# Patient Record
Sex: MALE | Race: WHITE
[De-identification: names, ages, dates, MRNs, and addresses within clinical notes are randomized per-mention and may not be internally consistent; named-entity substitution may affect disease eponyms.]

---

## 2019-02-11 ENCOUNTER — HOSPITAL ENCOUNTER (EMERGENCY)
Dept: HOSPITAL 74 - JER | Age: 79
Discharge: NURSING, REHAB OR LONG-TERM CARE | End: 2019-02-11
Payer: COMMERCIAL

## 2019-02-11 VITALS — DIASTOLIC BLOOD PRESSURE: 74 MMHG | TEMPERATURE: 98 F | HEART RATE: 88 BPM | SYSTOLIC BLOOD PRESSURE: 144 MMHG

## 2019-02-11 VITALS — BODY MASS INDEX: 29.2 KG/M2

## 2019-02-11 DIAGNOSIS — G20: ICD-10-CM

## 2019-02-11 DIAGNOSIS — L02.31: Primary | ICD-10-CM

## 2019-02-11 DIAGNOSIS — F17.210: ICD-10-CM

## 2019-02-11 DIAGNOSIS — Z85.79: ICD-10-CM

## 2019-02-11 DIAGNOSIS — J44.9: ICD-10-CM

## 2019-02-11 DIAGNOSIS — I12.9: ICD-10-CM

## 2019-02-11 DIAGNOSIS — N18.9: ICD-10-CM

## 2019-02-11 LAB
ALBUMIN SERPL-MCNC: 2.9 G/DL (ref 3.4–5)
ALP SERPL-CCNC: 129 U/L (ref 45–117)
ALT SERPL-CCNC: 8 U/L (ref 13–61)
ANION GAP SERPL CALC-SCNC: 5 MMOL/L (ref 8–16)
APPEARANCE UR: CLEAR
APTT BLD: 28.2 SECONDS (ref 25.2–36.5)
AST SERPL-CCNC: 11 U/L (ref 15–37)
BASOPHILS # BLD: 1.4 % (ref 0–2)
BILIRUB SERPL-MCNC: 0.3 MG/DL (ref 0.2–1)
BILIRUB UR STRIP.AUTO-MCNC: NEGATIVE MG/DL
BUN SERPL-MCNC: 25 MG/DL (ref 7–18)
CALCIUM SERPL-MCNC: 8.5 MG/DL (ref 8.5–10.1)
CHLORIDE SERPL-SCNC: 102 MMOL/L (ref 98–107)
CO2 SERPL-SCNC: 28 MMOL/L (ref 21–32)
COLOR UR: (no result)
CREAT SERPL-MCNC: 1.8 MG/DL (ref 0.55–1.3)
DEPRECATED RDW RBC AUTO: 18.7 % (ref 11.9–15.9)
EOSINOPHIL # BLD: 1.4 % (ref 0–4.5)
EPITH CASTS URNS QL MICRO: (no result) /HPF
GLUCOSE SERPL-MCNC: 103 MG/DL (ref 74–106)
GRAN CASTS URNS QL MICRO: 7 /LPF
HCT VFR BLD CALC: 31 % (ref 35.4–49)
HGB BLD-MCNC: 10.6 GM/DL (ref 11.7–16.9)
INR BLD: 1.03 (ref 0.83–1.09)
KETONES UR QL STRIP: NEGATIVE
LEUKOCYTE ESTERASE UR QL STRIP.AUTO: (no result)
LYMPHOCYTES # BLD: 11.7 % (ref 8–40)
MCH RBC QN AUTO: 33.1 PG (ref 25.7–33.7)
MCHC RBC AUTO-ENTMCNC: 34.3 G/DL (ref 32–35.9)
MCV RBC: 96.5 FL (ref 80–96)
MONOCYTES # BLD AUTO: 16.9 % (ref 3.8–10.2)
MUCOUS THREADS URNS QL MICRO: (no result)
NEUTROPHILS # BLD: 68.6 % (ref 42.8–82.8)
NITRITE UR QL STRIP: NEGATIVE
PH UR: 5 [PH] (ref 5–8)
PLATELET # BLD AUTO: 170 K/MM3 (ref 134–434)
PMV BLD: 8.4 FL (ref 7.5–11.1)
POTASSIUM SERPLBLD-SCNC: 4.9 MMOL/L (ref 3.5–5.1)
PROT SERPL-MCNC: 6.5 G/DL (ref 6.4–8.2)
PROT UR QL STRIP: NEGATIVE
PROT UR QL STRIP: NEGATIVE
PT PNL PPP: 12.1 SEC (ref 9.7–13)
RBC # BLD AUTO: 3.21 M/MM3 (ref 4–5.6)
SODIUM SERPL-SCNC: 135 MMOL/L (ref 136–145)
SP GR UR: 1.01 (ref 1.01–1.03)
UROBILINOGEN UR STRIP-MCNC: NEGATIVE MG/DL (ref 0.2–1)
WBC # BLD AUTO: 3.9 K/MM3 (ref 4–10)

## 2019-02-11 PROCEDURE — 0J990ZZ DRAINAGE OF BUTTOCK SUBCUTANEOUS TISSUE AND FASCIA, OPEN APPROACH: ICD-10-PCS

## 2019-02-11 PROCEDURE — 3E013BZ INTRODUCTION OF ANESTHETIC AGENT INTO SUBCUTANEOUS TISSUE, PERCUTANEOUS APPROACH: ICD-10-PCS

## 2019-02-11 PROCEDURE — 3E03329 INTRODUCTION OF OTHER ANTI-INFECTIVE INTO PERIPHERAL VEIN, PERCUTANEOUS APPROACH: ICD-10-PCS

## 2019-02-11 NOTE — PDOC
Attending Attestation





- Resident


Resident Name: Bobby Silvestre





- ED Attending Attestation


I have performed the following: I have examined & evaluated the patient, The 

case was reviewed & discussed with the resident, I agree w/resident's findings 

& plan





- HPI


HPI: 





02/11/19 12:54


78-year-old male from [facility] referred by Dr. Gibson for 

incision and drainage of left gluteal abscess/cellulitis. Patient has had a 

small skin lesion there for some time, more recently with increased discomfort. 

No systemic symptoms of fevers or chills, patient has not noted any discharge.





- Physicial Exam


PE: 





02/11/19 12:54


Afebrile, triage heart rate noted currently 70 on examination


Exam is within normal limits, patient is well-appearing


Left gluteal superficial abscess in the mid buttock approximately 8 cm of 

induration with central 3-4 cm of fluctuance with active foul-smelling purulent 

drainage, no bleeding. Surrounding rim of cellulitis, the lesion is mobile 

without underlying palpable mass or deformity.





- Medical Decision Making





02/11/19 12:56


78-year-old male from [facility] with left buttock abscess and 

cellulitis, no evidence of SIRS or sepsis.


Check labs


Dose of IV vancomycin


Incision and drainage of abscess


Discussed with Dr. Rg, who agrees with plan for I&D and transfer back 

to nursing home on antibiotics.





**Heart Score/ECG Review


  ** #1


ECG reviewed & interpreted by me at: 12:11


General ECG Interpretation: Sinus Rhythm, Normal Rate (65), Normal Intervals (

qtc 434), No acute ischemic changes

## 2019-02-11 NOTE — EKG
Test Reason : 

Blood Pressure : ***/*** mmHG

Vent. Rate : 065 BPM     Atrial Rate : 065 BPM

   P-R Int : 144 ms          QRS Dur : 088 ms

    QT Int : 418 ms       P-R-T Axes : 055 -06 064 degrees

   QTc Int : 434 ms

 

SINUS RHYTHM WITH MARKED SINUS ARRHYTHMIA WITH PREMATURE VENTRICULAR

COMPLEXES

LOW VOLTAGE QRS

CANNOT RULE OUT ANTERIOR INFARCT , AGE UNDETERMINED

ABNORMAL ECG

WHEN COMPARED WITH ECG OF 06-DEC-2016 16:02,

PREMATURE VENTRICULAR COMPLEXES ARE NOW PRESENT

PREMATURE ATRIAL COMPLEXES ARE NO LONGER PRESENT

Confirmed by SRINIVAS DOUGLAS MD ([id number]) on 2/11/2019 2:36:15 PM

 

Referred By:             Confirmed By:SRINIVAS DOUGLAS MD

## 2019-02-11 NOTE — PDOC
History of Present Illness





- General


Chief Complaint: Wound


Stated Complaint: Abscess Boil


Time Seen by Provider: 02/11/19 10:59


History Source: Patient


Exam Limitations: No Limitations





- History of Present Illness


Initial Comments: 








79 yo m w a hx of Multiple Myeloma, parkinsonian features, urethral strictures, 

chronic renal failure and HTN presents to the ER [facility] from [facility] because of 

left gluteal buttock pain. He states that he has had an area of redness and 

swelling for the past 6 months but he came to the ER today because his PCP - 

Dr. Rg sent him here to have his abscess incised and drained. He 

states that he used to have more pain but now his pain has improved. 





He denies any fevers, chills, chest pain, SOB, diarrhea, constipation, headache

, nausea, vomiting, blurry vision, abdominal pain, arm or leg pain, numbness, 

weakness, tingling or recent travel. 





PCP: Dr. Rg


Oncologist: Dr. Crowell


PSH: None reported


Social Hx: Smokes a few cigarettes per day, use to smoke more


Allergies: NKA, NKDA











Past History





- Past Medical History


Allergies/Adverse Reactions: 


 Allergies











Allergy/AdvReac Type Severity Reaction Status Date / Time


 


No Known Allergies Allergy   Verified 12/06/16 12:30











Home Medications: 


Ambulatory Orders





Aspirin [Aspirin EC] 81 mg PO DAILY 11/30/16 


Omeprazole 10 mg PO DAILY 11/30/16 


Pramipexole Dihydrochloride [Mirapex -] 0.25 mg PO TID 12/06/16 


Docusate Sodium [Colace -] 100 mg PO BID PRN #28 capsule 12/12/16 


Acetaminophen 650 mg PO QID PRN 02/11/19 


Amlodipine Besylate [Norvasc -] 10 mg PO DAILY 02/11/19 


Carbidopa/Levodopa *Cr* 50/200 [Sinemet *Cr* 50/200 -] 1 combo PO TID 02/11/19 


Clindamycin [Cleocin -] 450 mg PO Q6HPO #28 capsule 02/11/19 


Cyanocobalamin Vit B-12 Inj. [Redisol] 1,000 mcg IM MONTHLY 02/11/19 


Furosemide [Lasix -] 20 mg PO DAILY 02/11/19 


Lenalidomide [Revlimid] 10 mg PO ASDIR 02/11/19 


Multivitamin,Ther and Minerals [Vitamin and Minerals] 1 each PO DAILY 02/11/19 


Rivastigmine Tartrate [Rivastigmine] 3 mg PO TID 02/11/19 


Spironolactone [Aldactone] 25 mg PO DAILY 02/11/19 


Valacyclovir HCl [Valtrex] 500 mg PO Q48H 02/11/19 








Anemia: No


Asthma: No


Cancer: Yes (Multiple myeloma)


Cardiac Disorders: No


CVA: No


COPD: Yes (?-ON INHALER-SMOKES 1 PPD)


CHF: No


Dementia: No


Diabetes: No


GI Disorders: No


 Disorders: No


HTN: Yes (DX 4/2014)


Hypercholesterolemia: No


Liver Disease: No


Seizures: No


Thyroid Disease: No





- Surgical History


Abdominal Surgery: No


Appendectomy: No


Cardiac Surgery: No


Cholecystectomy: No


Lung Surgery: No


Neurologic Surgery: No


Orthopedic Surgery: No





- Suicide/Smoking/Psychosocial Hx


Smoking History: Never smoked


Have you smoked in the past 12 months: No


Number of Cigarettes Smoked Daily: 20


Information on smoking cessation initiated: No


'Breaking Loose' booklet given: 08/26/15


Hx Alcohol Use: No


Drug/Substance Use Hx: No


Substance Use Type: None


Hx Substance Use Treatment: No





**Review of Systems





- Review of Systems


Able to Perform ROS?: Yes


Comments:: 








CONSTITUTIONAL:


Absent: fever, no chills, no fatigue


EYES:


Absent: visual changes


ENT:


Absent: ear pain, no sore throat


CARDIOVASCULAR:


Absent: chest pain, no palpitations


RESPIRATORY:


Absent: cough, no SOB


GI:


Absent: abdominal pain, no nausea, no vomiting, no constipation, no diarrhea


GENITOURINARY:


Absent: dysuria, no frequency, no hematuria


MUSKULOSKELETAL:


Absent: back pain, no arthralgia, no myalgia


SKIN:


Present: rash


NEURO:


Absent: headache











*Physical Exam





- Vital Signs


 Last Vital Signs











Temp Pulse Resp BP Pulse Ox


 


 97 F L  120 H  18   123/90   100 


 


 02/11/19 10:20  02/11/19 10:20  02/11/19 10:20  02/11/19 10:20  02/11/19 10:20














- Physical Exam


Comments: 








GENERAL:


Well-appearing, well-nourished. No apparent distress.


HEENT:


Normocephalic, atraumatic. PERRL, EOM intact.


CARDIOVASCULAR:


Normal S1, S2. Tachycardic rate and regular rhythm.


PULMONARY:


No evidence of respiratory distress. Lungs clear to auscultation bilaterally. 

No wheezing, rales or rhonchi.


ABDOMEN:


Soft, non-distended, non-tender.


EXTREMITIES:


Normal ROM in all four extremities. No gross deformities.


BUTTOCKS: 


There is a 4x3 cm erythematous area on the left gluteal region that is TTP that 

is associated with induration, swelling, and fluctuance. 


SKIN:


Warm, dry.  No rash other than buttocks


NEUROLOGICAL:


No focal neurological deficits.











Moderate Sedation





- Procedure Monitoring


Vital Signs: 


Procedure Monitoring Vital Signs











Temperature  97 F L  02/11/19 10:20


 


Pulse Rate  120 H  02/11/19 10:20


 


Respiratory Rate  18   02/11/19 10:20


 


Blood Pressure  123/90   02/11/19 10:20


 


O2 Sat by Pulse Oximetry (%)  100   02/11/19 10:20











ED Treatment Course





- LABORATORY


CBC & Chemistry Diagram: 


 02/11/19 11:43





 02/11/19 11:32





Medical Decision Making





- Medical Decision Making





79 yo m w a hx of Multiple Myeloma, parkinsonian features, urethral strictures, 

chronic renal failure and HTN [facility] because of 

left gluteal buttock pain. He states that he has had an area of redness and 

swelling for the past 6 months but he came to the ER today because his PCP - 

Dr. Rg sent him here to have his abscess incised and drained. He 

states that he used to have more pain but now his pain has improved. 





VS: Tachycardic to 120, otherwise WNL





DDx IBNLT: cellulitis, abscess, fistula, sepsis.





Plan: ED adult sepsis workup -labs, urine, wound culture, I&D, Pelvis CT w 

contrast, re-assess





- I spoke with Dr. Rg who simply wanted his abscess drained and then 

sent back to [facility]. 





Giving patient vancomycin in the ER for his cellulitis. 


- Will continue with clindamycin once he is out of the ER. 





I drained the abscess on his gluteal region of his butt and noticed that fecal 

matter was coming out. 


- Will obtain a pelvis CT with contrast to assess for fistula





CTAP results: In comparison to a prior CT study of 12/9/2016 interval 

development of an approximately 3 x 2 cm thick-walled subcutaneous structure is 

seen suggestive of an abscess along the left buttock inferiorly. Within this 

structure is a small focus of air/gas. Adjacent subcutaneous edema is 

visualized. Within the adjacent subcutaneous edema more medially two additional 

punctate foci of air /gas are visualized. No definite fistulous tract is seen. 





Will DC patient with Abx, send back to [facility] and recommend a wound check in 

the next 48 hours. 














*DC/Admit/Observation/Transfer


Diagnosis at time of Disposition: 


 Abscess, Cellulitis








- Discharge Dispostion


Disposition: HOME


Condition at time of disposition: Improved


Decision to Admit order: No





- Referrals


Referrals: 


Jovan Rg MD [Primary Care Provider] - 





- Patient Instructions


Printed Discharge Instructions:  DI for Cellulitis -- Adult, DI for Anal Abscess


Additional Instructions: 


You came into the ER with pain on your bottom. We did an incision and drainage 

procedure to remove the pus. We are sending an antibiotic - clindamycin - to 

your pharmacy for you to take for the next seven days. Please make sure to go 

and pick it up. 





It is very important for you to have your wound checked in the next 48 hours to 

make sure it is healing well. 





Come back to the ER if your pain worsens, you get a fever, have severe pain, or 

have any other new or worsening concerns. 





Thank you for coming to the [facility] ER. We hope you feel better soon! 


Print Language: ENGLISH





- Post Discharge Activity

## 2019-09-04 ENCOUNTER — HOSPITAL ENCOUNTER (INPATIENT)
Dept: HOSPITAL 74 - JER | Age: 79
LOS: 2 days | Discharge: NURSING, REHAB OR LONG-TERM CARE | DRG: 683 | End: 2019-09-06
Payer: COMMERCIAL

## 2019-09-04 VITALS — BODY MASS INDEX: 22.7 KG/M2

## 2019-09-04 DIAGNOSIS — G20: ICD-10-CM

## 2019-09-04 DIAGNOSIS — C90.00: ICD-10-CM

## 2019-09-04 DIAGNOSIS — R29.6: ICD-10-CM

## 2019-09-04 DIAGNOSIS — Z66: ICD-10-CM

## 2019-09-04 DIAGNOSIS — N18.3: ICD-10-CM

## 2019-09-04 DIAGNOSIS — F03.90: ICD-10-CM

## 2019-09-04 DIAGNOSIS — F17.210: ICD-10-CM

## 2019-09-04 DIAGNOSIS — K63.5: ICD-10-CM

## 2019-09-04 DIAGNOSIS — N39.0: ICD-10-CM

## 2019-09-04 DIAGNOSIS — N17.9: ICD-10-CM

## 2019-09-04 DIAGNOSIS — G25.81: ICD-10-CM

## 2019-09-04 DIAGNOSIS — D61.818: ICD-10-CM

## 2019-09-04 DIAGNOSIS — N35.919: ICD-10-CM

## 2019-09-04 DIAGNOSIS — I12.9: Primary | ICD-10-CM

## 2019-09-04 DIAGNOSIS — J44.9: ICD-10-CM

## 2019-09-04 LAB
ALBUMIN SERPL-MCNC: 2.9 G/DL (ref 3.4–5)
ALP SERPL-CCNC: 147 U/L (ref 45–117)
ALT SERPL-CCNC: 8 U/L (ref 13–61)
ANION GAP SERPL CALC-SCNC: 8 MMOL/L (ref 8–16)
APPEARANCE UR: (no result)
APTT BLD: 28.4 SECONDS (ref 25.2–36.5)
AST SERPL-CCNC: 20 U/L (ref 15–37)
BACTERIA # UR AUTO: 4838.1 /HPF
BASOPHILS # BLD: 0.5 % (ref 0–2)
BILIRUB SERPL-MCNC: 0.4 MG/DL (ref 0.2–1)
BILIRUB UR STRIP.AUTO-MCNC: NEGATIVE MG/DL
BNP SERPL-MCNC: 915.8 PG/ML (ref 5–450)
BUN SERPL-MCNC: 38.6 MG/DL (ref 7–18)
CALCIUM SERPL-MCNC: 8.7 MG/DL (ref 8.5–10.1)
CASTS URNS QL MICRO: 4 /LPF (ref 0–8)
CHLORIDE SERPL-SCNC: 97 MMOL/L (ref 98–107)
CO2 SERPL-SCNC: 27 MMOL/L (ref 21–32)
COLOR UR: YELLOW
CREAT SERPL-MCNC: 2.1 MG/DL (ref 0.55–1.3)
DEPRECATED RDW RBC AUTO: 19.1 % (ref 11.9–15.9)
EOSINOPHIL # BLD: 6 % (ref 0–4.5)
EPITH CASTS URNS QL MICRO: 0.8 /HPF
GLUCOSE SERPL-MCNC: 85 MG/DL (ref 74–106)
HCT VFR BLD CALC: 27 % (ref 35.4–49)
HGB BLD-MCNC: 8.8 GM/DL (ref 11.7–16.9)
INR BLD: 0.94 (ref 0.83–1.09)
KETONES UR QL STRIP: NEGATIVE
LEUKOCYTE ESTERASE UR QL STRIP.AUTO: (no result)
LYMPHOCYTES # BLD: 7.8 % (ref 8–40)
MCH RBC QN AUTO: 32.3 PG (ref 25.7–33.7)
MCHC RBC AUTO-ENTMCNC: 32.4 G/DL (ref 32–35.9)
MCV RBC: 99.6 FL (ref 80–96)
MONOCYTES # BLD AUTO: 14.5 % (ref 3.8–10.2)
NEUTROPHILS # BLD: 71.2 % (ref 42.8–82.8)
NITRITE UR QL STRIP: POSITIVE
PH UR: 8 [PH] (ref 5–8)
PLATELET # BLD AUTO: 101 K/MM3 (ref 134–434)
PLATELET BLD QL SMEAR: (no result)
PMV BLD: 10.3 FL (ref 7.5–11.1)
POTASSIUM SERPLBLD-SCNC: 5.1 MMOL/L (ref 3.5–5.1)
PROT SERPL-MCNC: 6.3 G/DL (ref 6.4–8.2)
PROT UR QL STRIP: (no result)
PROT UR QL STRIP: NEGATIVE
PT PNL PPP: 11.1 SEC (ref 9.7–13)
RBC # BLD AUTO: 1 /HPF (ref 0–4)
RBC # BLD AUTO: 2.72 M/MM3 (ref 4–5.6)
SODIUM SERPL-SCNC: 132 MMOL/L (ref 136–145)
SP GR UR: 1.01 (ref 1.01–1.03)
UROBILINOGEN UR STRIP-MCNC: 1 MG/DL (ref 0.2–1)
WBC # BLD AUTO: 3.1 K/MM3 (ref 4–10)
WBC # UR AUTO: 118 /HPF (ref 0–5)

## 2019-09-04 PROCEDURE — G0378 HOSPITAL OBSERVATION PER HR: HCPCS

## 2019-09-04 RX ADMIN — SODIUM CHLORIDE SCH MLS/HR: 9 INJECTION, SOLUTION INTRAVENOUS at 20:20

## 2019-09-04 NOTE — PDOC
Rapid Medical Evaluation


Chief Complaint: Abnormal Lab Results (Outside)


Time Seen by Provider: 09/04/19 14:29


Medical Evaluation: 


 Allergies











Allergy/AdvReac Type Severity Reaction Status Date / Time


 


No Known Allergies Allergy   Verified 09/04/19 14:29








Vital Signs











Temp Pulse Resp BP Pulse Ox


 


 98.0 F   68   16   109/43 L  97 


 


 09/04/19 14:27  09/04/19 14:27  09/04/19 14:27  09/04/19 14:27  09/04/19 14:27








09/04/19 14:36


I have performed a brief in-person evaluation of this patient.





The patient presents with a chief complaint of:


 


Pertinent physical exam findings:stable and in NAD, non-focal





I have ordered the following:labs, ekg





The patient will proceed to the ED for further evaluation.





**Discharge Disposition





- Referrals


Referrals: 


Jovan Rg MD [Primary Care Provider] - 





- Patient Instructions





- Post Discharge Activity

## 2019-09-04 NOTE — PDOC
Attending Attestation





- Resident


Resident Name: Bobby Silvestre





- ED Attending Attestation


I have performed the following: I have examined & evaluated the patient, The 

case was reviewed & discussed with the resident, I agree w/resident's findings 

& plan, Exceptions are as noted





- HPI


HPI: 





09/04/19 17:46


agree with resident HPI





- Physicial Exam


PE: 





09/04/19 17:46


agree with resident exam 





- Medical Decision Making





09/04/19 17:47


77yo M presents to the ED for admission for renal failure by Dr. Rg


Pt asymptomatic here with unremarkable vitals and exam


Pt with outpt creatinine in mid 2s on outpt labs


+UTI, will cover with ceftriaxone


Creatinine here 2.1, remaining labs pending


Dr. Crowell called in, case discussed, she will see pt tomorrow


Anticipate admission

## 2019-09-04 NOTE — HP
Admitting History and Physical





- Primary Care Physician


PCP: Jovan Rg ([facility])





- Admission


Chief Complaint: Abnormal Lab Values


History of Present Illness: 





This is a 79 y/o man from [facility] with a PMHx of Multiple Myeloma, Parkinson

's Disease, Urethral Strictures, Chronic Renal Failure, HTN, COPD, RLS. Who 

presents to the ED for worsening renal function. Patient is alert to name and 

place, unable to provide HPI. Patient denies fever, chills, cough, SOB, 

dizziness, HA, CP, palpitations, AP, N/V/D, constipation, dysuria


History Source: Transfer Record


Limitations to Obtaining History: Clinical Condition





- Past Medical History


CNS: Yes: Parkinson's


Cardiovascular: Yes: HTN


Pulmonary: Yes: COPD, Other (copd)


Renal/: Yes: Renal Failure





- Advance Directives


Advance Directives: Yes: DNR (DNI)





- Smoking History


Smoking history: Current some day smoker


Have you smoked in the past 12 months: No


Aproximately how many cigarettes per day: 1





- Alcohol/Substance Use


Hx Alcohol Use: No


History of Substance Use: reports: None





- Social History


Usual Living Arrangement: Yes: Nursing Home


ADL: Support Services


History of Recent Travel: No





Home Medications





- Allergies


Allergies/Adverse Reactions: 


 Allergies











Allergy/AdvReac Type Severity Reaction Status Date / Time


 


No Known Allergies Allergy   Verified 09/04/19 14:29














- Home Medications


Home Medications: 


Ambulatory Orders





Aspirin [Aspirin EC] 81 mg PO DAILY 11/30/16 


Omeprazole 10 mg PO DAILY 11/30/16 


Pramipexole Dihydrochloride [Mirapex -] 0.25 mg PO TID 12/06/16 


Docusate Sodium [Colace -] 100 mg PO BID PRN #28 capsule 12/12/16 


Acetaminophen 650 mg PO QID PRN 02/11/19 


Amlodipine Besylate [Norvasc -] 10 mg PO DAILY 02/11/19 


Carbidopa/Levodopa *Cr* 50/200 [Sinemet *Cr* 50/200 -] 1 combo PO TID 02/11/19 


Clindamycin [Cleocin -] 450 mg PO Q6HPO #28 capsule 02/11/19 


Cyanocobalamin Vit B-12 Inj. [Redisol] 1,000 mcg IM MONTHLY 02/11/19 


Furosemide [Lasix -] 20 mg PO DAILY 02/11/19 


Lenalidomide [Revlimid] 10 mg PO ASDIR 02/11/19 


Multivitamin,Ther and Minerals [Vitamin and Minerals] 1 each PO DAILY 02/11/19 


Rivastigmine Tartrate [Rivastigmine] 3 mg PO TID 02/11/19 


Spironolactone [Aldactone] 25 mg PO DAILY 02/11/19 


Valacyclovir HCl [Valtrex] 500 mg PO Q48H 02/11/19 








Home Medications (free text): Norvasc 10mg po QD





Family Disease History





- Family Disease History


Family History: Unable to Obtain





Review of Systems





- Review of Systems


Constitutional: reports: No Symptoms


Eyes: reports: No Symptoms


HENT: reports: No Symptoms


Neck: reports: No Symptoms


Cardiovascular: reports: No Symptoms


Respiratory: reports: No Symptoms


Gastrointestinal: reports: No Symptoms


Genitourinary: reports: No Symptoms


Breasts: reports: No Symptoms Reported


Musculoskeletal: reports: No Symptoms


Integumentary: reports: No Symptoms


Neurological: reports: No Symptoms


Endocrine: reports: No Symptoms


Hematology/Lymphatic: reports: No Symptoms


Psychiatric: reports: No Symptoms


Pain Intensity: 0





Physical Examination


Vital Signs: 


 Vital Signs











Temperature  98.0 F   09/04/19 14:27


 


Pulse Rate  68   09/04/19 14:27


 


Respiratory Rate  16   09/04/19 14:27


 


Blood Pressure  109/43 L  09/04/19 14:27


 


O2 Sat by Pulse Oximetry (%)  97   09/04/19 14:27











Constitutional: Yes: No Distress, Calm


Eyes: Yes: Conjunctiva Clear, EOM Intact, PERRL


HENT: Yes: WNL, Atraumatic, Normocephalic


Neck: Yes: WNL, Supple, Trachea Midline


Cardiovascular: Yes: WNL, Regular Rate and Rhythm, S1, S2


Respiratory: Yes: WNL, Regular, CTA Bilaterally


Gastrointestinal: Yes: WNL, Normal Bowel Sounds, Soft


...Rectal Exam: Yes: Deferred


Renal/: Yes: WNL


Breast(s): Yes: WNL


Musculoskeletal: Yes: WNL


Extremities: Yes: WNL


Edema: No


Peripheral Pulses WNL: Yes


Neurological: Yes: Alert (name and place), Confusion, Cran Nerves II-XII Intact


...Motor Strength: WNL


Psychiatric: Yes: WNL, Alert


Labs: 


 CBC, BMP





 09/04/19 14:47 





 09/04/19 17:10 





 Laboratory Results - last 24 hr











  09/04/19 09/04/19 09/04/19





  14:47 15:10 15:10


 


WBC  3.1 L  


 


RBC  2.72 L  


 


Hgb  8.8 L  


 


Hct  27.0 L  


 


MCV  99.6 H  


 


MCH  32.3  


 


MCHC  32.4  


 


RDW  19.1 H  


 


Plt Count  101 L D  


 


MPV  10.3  D  


 


Absolute Neuts (auto)  2.2  


 


Neutrophils %  71.2  


 


Neutrophils % (Manual)  74.5  


 


Band Neutrophils %  0.0  


 


Lymphocytes %  7.8 L D  


 


Lymphocytes % (Manual)  7.2 L  


 


Monocytes %  14.5 H  


 


Monocytes % (Manual)  11 H  


 


Eosinophils %  6.0 H D  


 


Eosinophils % (Manual)  5.1 H  


 


Basophils %  0.5  


 


Basophils % (Manual)  0.0  


 


Myelocytes % (Man)  1  


 


Promyelocytes % (Man)  0  


 


Blast Cells % (Manual)  0  


 


Nucleated RBC %  0  


 


Metamyelocytes  1  


 


Platelet Estimate  Decreased  


 


Platelet Comment  No clumping noted  


 


PT with INR   


 


INR   


 


PTT (Actin FS)   


 


Sodium   


 


Potassium   


 


Chloride   


 


Carbon Dioxide   


 


Anion Gap   


 


BUN   


 


Creatinine   


 


Est GFR (CKD-EPI)AfAm   


 


Est GFR (CKD-EPI)NonAf   


 


Random Glucose   


 


Calcium   


 


Total Bilirubin   


 


AST   


 


ALT   


 


Alkaline Phosphatase   


 


Troponin I   


 


B-Natriuretic Peptide   


 


Total Protein   


 


Albumin   


 


Urine Color   


 


Urine Appearance   


 


Urine pH   


 


Ur Specific Gravity   


 


Urine Protein   


 


Urine Glucose (UA)   


 


Urine Ketones   


 


Urine Blood   


 


Urine Nitrite   


 


Urine Bilirubin   


 


Urine Urobilinogen   


 


Ur Leukocyte Esterase   


 


Urine WBC (Auto)   


 


Urine RBC (Auto)   


 


Urine Casts (Auto)   


 


U Epithel Cells (Auto)   


 


Urine Bacteria (Auto)   


 


Ur Random Creatinine    49.0


 


Ur Random Sodium   16 L 














  09/04/19 09/04/19 09/04/19





  15:10 17:10 17:10


 


WBC   


 


RBC   


 


Hgb   


 


Hct   


 


MCV   


 


MCH   


 


MCHC   


 


RDW   


 


Plt Count   


 


MPV   


 


Absolute Neuts (auto)   


 


Neutrophils %   


 


Neutrophils % (Manual)   


 


Band Neutrophils %   


 


Lymphocytes %   


 


Lymphocytes % (Manual)   


 


Monocytes %   


 


Monocytes % (Manual)   


 


Eosinophils %   


 


Eosinophils % (Manual)   


 


Basophils %   


 


Basophils % (Manual)   


 


Myelocytes % (Man)   


 


Promyelocytes % (Man)   


 


Blast Cells % (Manual)   


 


Nucleated RBC %   


 


Metamyelocytes   


 


Platelet Estimate   


 


Platelet Comment   


 


PT with INR   11.10 


 


INR   0.94 


 


PTT (Actin FS)   28.4 


 


Sodium    132 L


 


Potassium    5.1


 


Chloride    97 L


 


Carbon Dioxide    27


 


Anion Gap    8


 


BUN    38.6 H


 


Creatinine    2.1 H


 


Est GFR (CKD-EPI)AfAm    33.92


 


Est GFR (CKD-EPI)NonAf    29.27


 


Random Glucose    85


 


Calcium    8.7


 


Total Bilirubin    0.4


 


AST    20


 


ALT    8 L


 


Alkaline Phosphatase    147 H


 


Troponin I    < 0.02


 


B-Natriuretic Peptide    915.8 H


 


Total Protein    6.3 L


 


Albumin    2.9 L


 


Urine Color  Yellow  


 


Urine Appearance  Cloudy  


 


Urine pH  8.0  D  


 


Ur Specific [place]  1.011  


 


Urine Protein  1+ H  


 


Urine Glucose (UA)  Negative  


 


Urine Ketones  Negative  


 


Urine Blood  Negative  


 


Urine Nitrite  Positive H  


 


Urine Bilirubin  Negative  


 


Urine Urobilinogen  1.0  


 


Ur Leukocyte Esterase  3+ H  


 


Urine WBC (Auto)  118  


 


Urine RBC (Auto)  1  


 


Urine Casts (Auto)  4  


 


U Epithel Cells (Auto)  0.8  


 


Urine Bacteria (Auto)  4838.1  


 


Ur Random Creatinine   


 


Ur Random Sodium   








 Intake & Output











 09/02/19 09/03/19 09/04/19 09/05/19





 23:59 23:59 23:59 23:59


 


Intake Total   0 


 


Balance   0 


 


Weight   63.049 kg 








 Current Medications











Generic Name Dose Route Start Last Admin





  Trade Name Freq  PRN Reason Stop Dose Admin


 


Carbidopa/Levodopa  1 combo  09/04/19 22:00  09/05/19 00:31





  Sinemet *Cr* 50/200 -  PO   1 combo





  TID LAYLA   Administration





     





     





     





     


 


Docusate Sodium  100 mg  09/04/19 19:58  





  Colace -  PO   





  BID PRN   





  CONSTIPATION   





     





     





     


 


Sodium Chloride  1,000 mls @ 75 mls/hr  09/04/19 18:45  09/04/19 20:20





  Normal Saline -  IV   75 mls/hr





  ASDIR LAYLA   Administration





     





     





     





     


 


Ceftriaxone Sodium 1 gm/  50 mls @ 100 mls/hr  09/05/19 10:00  





  Dextrose  IVPB   





  DAILY LAYLA   





     





     





  Protocol   





     














Imaging





- Results


Chest X-ray: Image Reviewed


Ultrasound: Image Reviewed


Other: Image Reviewed





Problem List





- Problems


(1) Renal failure (ARF), acute on chronic


Assessment/Plan: 


Likely secondary to UTI


Cr 2.1 baseline (0.7-6)


Appreciate Nephrology consult


NS bolus given in ED


Continue IVF per Nephrology recommendations


Monitor BMP


Urine random Na 16


Urine random Cr 49


Renal US report- no hydronephrosis, findings suggestive of medical renal disease


Avoid Nephrotoxic drugs


Monitor vitals





Code(s): N17.9 - ACUTE KIDNEY FAILURE, UNSPECIFIED; N18.9 - CHRONIC KIDNEY 

DISEASE, UNSPECIFIED   





(2) COPD (chronic obstructive pulmonary disease)


Assessment/Plan: 


Stable


Continue Duonebs


Continue home meds


Peak Flow


Chest Xray image reviewed


O2


Code(s): J44.9 - CHRONIC OBSTRUCTIVE PULMONARY DISEASE, UNSPECIFIED   





(3) HTN (hypertension)


Assessment/Plan: 


Stable


Continue Norvasc


Monitor renal function


Code(s): I10 - ESSENTIAL (PRIMARY) HYPERTENSION   





(4) Multiple myeloma


Assessment/Plan: 


PET Scan reviewed


Appreciate Oncology consult


Monitor CBC


Code(s): C90.00 - MULTIPLE MYELOMA NOT HAVING ACHIEVED REMISSION   





Assessment/Plan





This is a 79 y/o man with a PMHx of Multiple Myeloma, Parkinson's Disease, 

Urethral Strictures, Chronic Renal Failure, HTN, COPD, RLS. Admitted for Acute 

on Chronic Renal Failure for further evaluation of their emergent condition.





Plan:


See Above





FEN


NS@75ml/hr


Replete lytes prn


Low Na Diet





DVT ppx


OOB


SCDs


Heparin SQ





Dispo: Requires Inpatient Care











Visit type





- Emergency Visit


Emergency Visit: Yes


ED Registration Date: 09/04/19


Care time: The patient presented to the Emergency Department on the above date 

and was hospitalized for further evaluation of their emergent condition.





- New Patient


This patient is new to me today: Yes


Date on this admission: 09/04/19





- Critical Care


Critical Care patient: No

## 2019-09-04 NOTE — PDOC
History of Present Illness





- General


Chief Complaint: Abnormal Lab Results (Outside)


Stated Complaint: SENT BY PCP


Time Seen by Provider: 09/04/19 14:29


History Source: Patient


Exam Limitations: No Limitations





- History of Present Illness


Initial Comments: 











79 yo m w a hx of Multiple Myeloma, parkinsonian features, urethral strictures, 

chronic renal failure and HTN [facility] sent by 

his PCP - Dr. Rg to be admitted and evaluated for renal failure. 

Patient denies having any complaints. Denies having any pain or discomfort at 

the present time. 





He denies any fevers, chills, chest pain, SOB, diarrhea, constipation, headache

, nausea, vomiting, blurry vision, abdominal pain, arm or leg pain, numbness, 

weakness, tingling or recent travel. 





PCP: Dr. Rg


Oncologist: Dr. Crowell


PSH: None reported


Social Hx: Smokes a few cigarettes per day, use to smoke more. [facility] 

resident, walks with a walker


Allergies: NKA, NKDA











Past History





- Past Medical History


Allergies/Adverse Reactions: 


 Allergies











Allergy/AdvReac Type Severity Reaction Status Date / Time


 


No Known Allergies Allergy   Verified 09/04/19 14:29











Home Medications: 


Ambulatory Orders





Aspirin [Aspirin EC] 81 mg PO DAILY 11/30/16 


Omeprazole 10 mg PO DAILY 11/30/16 


Pramipexole Dihydrochloride [Mirapex -] 0.25 mg PO TID 12/06/16 


Docusate Sodium [Colace -] 100 mg PO BID PRN #28 capsule 12/12/16 


Acetaminophen 650 mg PO QID PRN 02/11/19 


Amlodipine Besylate [Norvasc -] 10 mg PO DAILY 02/11/19 


Carbidopa/Levodopa *Cr* 50/200 [Sinemet *Cr* 50/200 -] 1 combo PO TID 02/11/19 


Clindamycin [Cleocin -] 450 mg PO Q6HPO #28 capsule 02/11/19 


Cyanocobalamin Vit B-12 Inj. [Redisol] 1,000 mcg IM MONTHLY 02/11/19 


Furosemide [Lasix -] 20 mg PO DAILY 02/11/19 


Lenalidomide [Revlimid] 10 mg PO ASDIR 02/11/19 


Multivitamin,Ther and Minerals [Vitamin and Minerals] 1 each PO DAILY 02/11/19 


Rivastigmine Tartrate [Rivastigmine] 3 mg PO TID 02/11/19 


Spironolactone [Aldactone] 25 mg PO DAILY 02/11/19 


Valacyclovir HCl [Valtrex] 500 mg PO Q48H 02/11/19 








Anemia: No


Asthma: No


Cancer: Yes (Multiple myeloma)


Cardiac Disorders: No


CVA: No


COPD: Yes (?-ON INHALER-SMOKES 1 PPD)


CHF: No


Dementia: No


Diabetes: No


GI Disorders: No


 Disorders: No


HTN: Yes (DX 4/2014)


Hypercholesterolemia: No


Liver Disease: No


Seizures: No


Thyroid Disease: No





- Surgical History


Abdominal Surgery: No


Appendectomy: No


Cardiac Surgery: No


Cholecystectomy: No


Lung Surgery: No


Neurologic Surgery: No


Orthopedic Surgery: No





- Suicide/Smoking/Psychosocial Hx


Smoking History: Current some day smoker


Have you smoked in the past 12 months: No


Number of Cigarettes Smoked Daily: 1


Information on smoking cessation initiated: No


'Breaking Loose' booklet given: 08/26/15


Hx Alcohol Use: No


Drug/Substance Use Hx: No


Substance Use Type: None


Hx Substance Use Treatment: No





**Review of Systems





- Review of Systems


Able to Perform ROS?: Yes


Comments:: 








CONSTITUTIONAL:


Absent: fever, no chills, no fatigue


EYES:


Absent: visual changes


ENT:


Absent: ear pain, no sore throat


CARDIOVASCULAR:


Absent: chest pain, no palpitations


RESPIRATORY:


Absent: cough, no SOB


GI:


Absent: abdominal pain, no nausea, no vomiting, no constipation, no diarrhea


GENITOURINARY:


Absent: dysuria, no frequency, no hematuria


MUSKULOSKELETAL:


Absent: back pain, no arthralgia, no myalgia


SKIN:


Present: Rash


NEURO:


Absent: headache











*Physical Exam





- Vital Signs


 Last Vital Signs











Temp Pulse Resp BP Pulse Ox


 


 98.0 F   68   16   109/43 L  97 


 


 09/04/19 14:27  09/04/19 14:27  09/04/19 14:27  09/04/19 14:27  09/04/19 14:27














- Physical Exam


Comments: 








GENERAL:


Well-appearing, well-nourished. No apparent distress.


HEENT:


Normocephalic, atraumatic. PERRL, EOM intact.


CARDIOVASCULAR:


Normal S1, S2. Regular rate and rhythm.


PULMONARY:


There are bibasilar crackles. No evidence of respiratory distress. 


ABDOMEN:


Soft, non-distended, non-tender.


EXTREMITIES:


limited ROM in lower extremities. No gross deformities.


SKIN:


There are multiple echymosis from prior blood draws. Warm, dry.


NEUROLOGICAL:


No focal neurological deficits.








ED Treatment Course





- LABORATORY


CBC & Chemistry Diagram: 


 09/05/19 06:45





 09/05/19 06:45





- RADIOLOGY


Radiograph Interpretation: 








CXR: Chest: Cough A single view of the chest reveals well expanded lung fields 

with no sign of infiltrate or failure , prominent heart, sclerotic knob and 

prominent alyssa. After sharp. The soft tissues are intact. There are 

degenerative changes. An acute process is not seen. 





Renal US: Renal ultrasound Clinical information: acute kidney injury There is 

no hydronephrosis. The renal cortices bilaterally demonstrate increased 

echogenicity suggestive of medical nephropathy. There is mild diffuse bilateral 

renal cortical atrophy. The kidneys appear unremarkable in size each measuring 

approximately 10.4 cm in length. Apparent no obvious calculus or mass lesion is 

identified within the limitations of sonography. Incidental 2 cm right renal 

cortical cyst. There is no obvious perirenal fluid collection. Impression: No 

hydronephrosis is seen. Findings are noted suggestive of medical renal disease 

as discussed above. 











Medical Decision Making





- Medical Decision Making








79 yo m w a hx of Multiple Myeloma, parkinsonian features, urethral strictures, 

chronic renal failure and HTN presents to the ER [facility] from [facility] sent by 

his PCP - Dr. Rg to be admitted and evaluated for renal failure. 

Patient denies having any complaints. Denies having any pain or discomfort at 

the present time. 





He denies any fevers, chills, chest pain, SOB, diarrhea, constipation, headache

, nausea, vomiting, blurry vision, abdominal pain, arm or leg pain, numbness, 

weakness, tingling or recent travel. 





Vital Signs











Temp Pulse Resp BP Pulse Ox


 


 98.0 F   68   16   109/43 L  97 


 


 09/04/19 14:27  09/04/19 14:27  09/04/19 14:27  09/04/19 14:27  09/04/19 14:27








- Hypotensive diastolicly





DDx IBNLT: MM exacerbation, MORALES, electrolyte/metabolic disturbance, UTI/Pylo, 

drug toxicity





MDM: Patient sent in by PCP for renal failure. Will obtain urine lytes to 

calculate FeNa, obtain renal consult, and admit to hospital. 





Plan: Labs, urine, cxr, renal consult, admit to hospital. 





Labs: Supports MORALES as well as elevated BUN, mildly low Sodium


- FeNA: 0.5% - suggests pre-renal injury





Urine: Shows patient has a UTI


- Will treat with ceftriaxone





CXR: Chest: Cough A single view of the chest reveals well expanded lung fields 

with no sign of infiltrate or failure , prominent heart, sclerotic knob and 

prominent alyssa. After sharp. The soft tissues are intact. There are 

degenerative changes. An acute process is not seen. 





Renal US: Renal ultrasound Clinical information: acute kidney injury There is 

no hydronephrosis. The renal cortices bilaterally demonstrate increased 

echogenicity suggestive of medical nephropathy. There is mild diffuse bilateral 

renal cortical atrophy. The kidneys appear unremarkable in size each measuring 

approximately 10.4 cm in length. Apparent no obvious calculus or mass lesion is 

identified within the limitations of sonography. Incidental 2 cm right renal 

cortical cyst. There is no obvious perirenal fluid collection. Impression: No 

hydronephrosis is seen. Findings are noted suggestive of medical renal disease 

as discussed above. 





Renal Consult - [facility]: Recommends Renal US and hydration with 75/hr of NS





Disposition: Admit to hospital for renal injury








09/05/19 19:02








*DC/Admit/Observation/Transfer


Diagnosis at time of Disposition: 


 Renal insufficiency








- Discharge Dispostion


Condition at time of disposition: Stable


Decision to Admit order: Yes





- Referrals





- Patient Instructions





- Post Discharge Activity

## 2019-09-05 LAB
ALBUMIN SERPL-MCNC: 2.4 G/DL (ref 3.4–5)
ALP SERPL-CCNC: 130 U/L (ref 45–117)
ALT SERPL-CCNC: < 6 U/L (ref 13–61)
ANION GAP SERPL CALC-SCNC: 12 MMOL/L (ref 8–16)
ANISOCYTOSIS BLD QL: (no result)
AST SERPL-CCNC: 8 U/L (ref 15–37)
BASOPHILS # BLD: 0.6 % (ref 0–2)
BILIRUB SERPL-MCNC: 0.3 MG/DL (ref 0.2–1)
BUN SERPL-MCNC: 33 MG/DL (ref 7–18)
CALCIUM SERPL-MCNC: 8.1 MG/DL (ref 8.5–10.1)
CHLORIDE SERPL-SCNC: 104 MMOL/L (ref 98–107)
CO2 SERPL-SCNC: 25 MMOL/L (ref 21–32)
CREAT SERPL-MCNC: 1.9 MG/DL (ref 0.55–1.3)
DEPRECATED RDW RBC AUTO: 19.4 % (ref 11.9–15.9)
EOSINOPHIL # BLD: 7.3 % (ref 0–4.5)
GLUCOSE SERPL-MCNC: 80 MG/DL (ref 74–106)
HCT VFR BLD CALC: 24.1 % (ref 35.4–49)
HGB BLD-MCNC: 8 GM/DL (ref 11.7–16.9)
LYMPHOCYTES # BLD: 9.6 % (ref 8–40)
MACROCYTES BLD QL: (no result)
MCH RBC QN AUTO: 32.7 PG (ref 25.7–33.7)
MCHC RBC AUTO-ENTMCNC: 33 G/DL (ref 32–35.9)
MCV RBC: 98.9 FL (ref 80–96)
MONOCYTES # BLD AUTO: 15.6 % (ref 3.8–10.2)
NEUTROPHILS # BLD: 66.9 % (ref 42.8–82.8)
OVALOCYTES BLD QL SMEAR: (no result)
PLATELET # BLD AUTO: 80 K/MM3 (ref 134–434)
PLATELET BLD QL SMEAR: (no result)
PMV BLD: 10.1 FL (ref 7.5–11.1)
POTASSIUM SERPLBLD-SCNC: 4.2 MMOL/L (ref 3.5–5.1)
PROT SERPL-MCNC: 5.4 G/DL (ref 6.4–8.2)
RBC # BLD AUTO: 2.44 M/MM3 (ref 4–5.6)
SODIUM SERPL-SCNC: 140 MMOL/L (ref 136–145)
WBC # BLD AUTO: 2 K/MM3 (ref 4–10)

## 2019-09-05 RX ADMIN — IPRATROPIUM BROMIDE AND ALBUTEROL SULFATE SCH AMP: .5; 3 SOLUTION RESPIRATORY (INHALATION) at 16:32

## 2019-09-05 RX ADMIN — CEFTRIAXONE SCH MLS/HR: 1 INJECTION, POWDER, FOR SOLUTION INTRAMUSCULAR; INTRAVENOUS at 10:04

## 2019-09-05 RX ADMIN — SODIUM CHLORIDE SCH: 9 INJECTION, SOLUTION INTRAVENOUS at 20:37

## 2019-09-05 RX ADMIN — SODIUM CHLORIDE SCH MLS/HR: 9 INJECTION, SOLUTION INTRAVENOUS at 13:42

## 2019-09-05 RX ADMIN — IPRATROPIUM BROMIDE AND ALBUTEROL SULFATE SCH AMP: .5; 3 SOLUTION RESPIRATORY (INHALATION) at 20:39

## 2019-09-05 RX ADMIN — IPRATROPIUM BROMIDE AND ALBUTEROL SULFATE SCH: .5; 3 SOLUTION RESPIRATORY (INHALATION) at 13:00

## 2019-09-05 NOTE — EKG
Test Reason : 

Blood Pressure : ***/*** mmHG

Vent. Rate : 069 BPM     Atrial Rate : 069 BPM

   P-R Int : 144 ms          QRS Dur : 088 ms

    QT Int : 430 ms       P-R-T Axes : 041 -08 063 degrees

   QTc Int : 460 ms

 

*** POOR DATA QUALITY, INTERPRETATION MAY BE ADVERSELY AFFECTED

SINUS RHYTHM WITH MARKED SINUS ARRHYTHMIA

LOW VOLTAGE QRS

BORDERLINE ECG

WHEN COMPARED WITH ECG OF 11-FEB-2019 12:11,

PREMATURE VENTRICULAR COMPLEXES ARE NO LONGER PRESENT

Confirmed by NORTH LIU MD ([id number]) on 9/5/2019 12:26:45 PM

 

Referred By:             Confirmed By:NORTH LIU MD

## 2019-09-05 NOTE — PN
Teaching Attending Note


Name of Resident: Duc Muir (Nephrology)





ATTENDING PHYSICIAN STATEMENT





I saw and evaluated the patient.


I reviewed the resident's note and discussed the case with the resident.


I agree with the resident's findings and plan as documented.








Renal


Pt is a 78 year old male with pmhx of ckd and myeloma who was sent


in for elevated creatinine. He was found to have a UTI. He says that


he feels better today. He denies shortness of breath.


pmhx ckd uti htn multiple myeloma


nkda


social hx denies 


fam hx non contrib





 Laboratory Tests











  09/04/19 09/04/19 09/05/19





  15:10 17:10 06:45


 


Creatinine   2.1 H  1.9 H


 


Urine Nitrite  Positive H  


 


Urine Urobilinogen  1.0  


 


Free Kappa LC, Quant   


 


Free Lambda LC, Quant   


 


Free Kappa/Lambda Ratio   














  09/05/19





  06:45


 


Creatinine 


 


Urine Nitrite 


 


Urine Urobilinogen 


 


Free Kappa LC, Quant  Pending


 


Free Lambda LC, Quant  Pending


 


Free Kappa/Lambda Ratio  Pending








 Current Medications











Generic Name Dose Route Start Last Admin





  Trade Name Freq  PRN Reason Stop Dose Admin


 


Albuterol/Ipratropium  1 amp  09/05/19 12:00  09/05/19 13:00





  Duoneb -  NEB   Not Given





  RQID LAYLA   





     





     





     





     


 


Carbidopa/Levodopa  1 combo  09/04/19 22:00  09/05/19 13:43





  Sinemet *Cr* 50/200 -  PO   1 combo





  TID LAYLA   Administration





     





     





     





     


 


Docusate Sodium  100 mg  09/04/19 19:58  





  Colace -  PO   





  BID PRN   





  CONSTIPATION   





     





     





     


 


Sodium Chloride  1,000 mls @ 75 mls/hr  09/04/19 18:45  09/05/19 13:42





  Normal Saline -  IV   75 mls/hr





  ASDIR LAYLA   Administration





     





     





     





     


 


Ceftriaxone Sodium 1 gm/  50 mls @ 100 mls/hr  09/05/19 10:00  09/05/19 10:04





  Dextrose  IVPB   100 mls/hr





  DAILY LAYLA   Administration





     





     





  Protocol   





     

















Impression


1. ckd


2. UTI


3. HTN


4. cataract


5. urinary obstruction/strictures


6. multiple myeloma





Plan


- renal function is improving


- cont fluids


- follow cultures


- cont abx


- discussed with resident

## 2019-09-05 NOTE — CONSULT
Consultation: 


CONSULT SERVICE: Nephrology Resident (Dr. Flores)


HISTORY OF PRESENT ILLNESS:


   77yo M with h/o multiple myeloma, HTN, CKD Stage 3, COPD who initially 

presented from Dr. Crowell's office due to elevated Cr. We were asked to 

medically evaluate this patient as a result of his decreased kidney function 

and mild hyponatremia. Currently patient has no complaints at this time. In Ed 

pt was given IVF and treated with Rocephin due to a UTI. Pt's Cr has improved 

is back to his baseline.


   


PMHx:As above


PSHx:Denies


SoHx:


   Tobacco - Current smoker


   Alcohol - Denies


   Illicit drugs - Denies


   Lives in NH with assist support; son involved in care





REVIEW OF SYSTEMS:


As per HPI





PHYSICAL EXAMINATION





 Vital Signs - 24 hr











  09/04/19 09/04/19 09/05/19





  14:27 22:30 01:50


 


Temperature 98.0 F 98.2 F 98.5 F


 


Pulse Rate 68 83 80


 


Respiratory 16 20 18





Rate   


 


Blood Pressure 109/43 L 112/53 L 129/75


 


O2 Sat by Pulse 97 95 





Oximetry (%)   














  09/05/19 09/05/19 09/05/19





  03:00 03:56 06:26


 


Temperature 97.7 F  97.8 F


 


Pulse Rate 65  75


 


Respiratory 18 18 20





Rate   


 


Blood Pressure 119/78  104/46 L


 


O2 Sat by Pulse  95 





Oximetry (%)   














GENERAL: Frail appearing, Awake, alert, and fully oriented, in no acute 

distress.


HEENT: NC/AT, KAMRAN, slight pallor noted, MMM


NECK: No JVD 


LUNGS: CTA bilaterally. No wheezes, and no crackles. No accessory muscle use.


HEART: RRR, normal S1 and S2 without murmur 


ABDOMEN: Soft, nontender, tympanitic, slightly distended, normoactive bowel 

sounds, no guarding, no masses. 


: Urinal at bedside with yellow-clear urine


MUSCULOSKELETAL: No CVA tenderness.


XTREMITIES: 2+ DP pulses, warm, well-perfused. No calf tenderness. No 

peripheral edema. 


PSYCHIATRIC: Cooperative. Good eye contact. Appropriate mood and affect.


SKIN: Warm, dry, no rashes  











 Laboratory Results - last 24 hr











  09/04/19 09/04/19 09/04/19





  14:47 15:10 15:10


 


WBC  3.1 L  


 


RBC  2.72 L  


 


Hgb  8.8 L  


 


Hct  27.0 L  


 


MCV  99.6 H  


 


MCH  32.3  


 


MCHC  32.4  


 


RDW  19.1 H  


 


Plt Count  101 L D  


 


MPV  10.3  D  


 


Absolute Neuts (auto)  2.2  


 


Neutrophils %  71.2  


 


Neutrophils % (Manual)  74.5  


 


Band Neutrophils %  0.0  


 


Lymphocytes %  7.8 L D  


 


Lymphocytes % (Manual)  7.2 L  


 


Monocytes %  14.5 H  


 


Monocytes % (Manual)  11 H  


 


Eosinophils %  6.0 H D  


 


Eosinophils % (Manual)  5.1 H  


 


Basophils %  0.5  


 


Basophils % (Manual)  0.0  


 


Myelocytes % (Man)  1  


 


Promyelocytes % (Man)  0  


 


Blast Cells % (Manual)  0  


 


Nucleated RBC %  0  


 


Metamyelocytes  1  


 


Platelet Estimate  Decreased  


 


Platelet Comment  No clumping noted  


 


PT with INR   


 


INR   


 


PTT (Actin FS)   


 


Sodium   


 


Potassium   


 


Chloride   


 


Carbon Dioxide   


 


Anion Gap   


 


BUN   


 


Creatinine   


 


Est GFR (CKD-EPI)AfAm   


 


Est GFR (CKD-EPI)NonAf   


 


Random Glucose   


 


Calcium   


 


Total Bilirubin   


 


AST   


 


ALT   


 


Alkaline Phosphatase   


 


Troponin I   


 


B-Natriuretic Peptide   


 


Total Protein   


 


Albumin   


 


Urine Color   


 


Urine Appearance   


 


Urine pH   


 


Ur Specific Gravity   


 


Urine Protein   


 


Urine Glucose (UA)   


 


Urine Ketones   


 


Urine Blood   


 


Urine Nitrite   


 


Urine Bilirubin   


 


Urine Urobilinogen   


 


Ur Leukocyte Esterase   


 


Urine WBC (Auto)   


 


Urine RBC (Auto)   


 


Urine Casts (Auto)   


 


U Epithel Cells (Auto)   


 


Urine Bacteria (Auto)   


 


Ur Random Creatinine    49.0


 


Ur Random Sodium   16 L 














  09/04/19 09/04/19 09/04/19





  15:10 17:10 17:10


 


WBC   


 


RBC   


 


Hgb   


 


Hct   


 


MCV   


 


MCH   


 


MCHC   


 


RDW   


 


Plt Count   


 


MPV   


 


Absolute Neuts (auto)   


 


Neutrophils %   


 


Neutrophils % (Manual)   


 


Band Neutrophils %   


 


Lymphocytes %   


 


Lymphocytes % (Manual)   


 


Monocytes %   


 


Monocytes % (Manual)   


 


Eosinophils %   


 


Eosinophils % (Manual)   


 


Basophils %   


 


Basophils % (Manual)   


 


Myelocytes % (Man)   


 


Promyelocytes % (Man)   


 


Blast Cells % (Manual)   


 


Nucleated RBC %   


 


Metamyelocytes   


 


Platelet Estimate   


 


Platelet Comment   


 


PT with INR   11.10 


 


INR   0.94 


 


PTT (Actin FS)   28.4 


 


Sodium    132 L


 


Potassium    5.1


 


Chloride    97 L


 


Carbon Dioxide    27


 


Anion Gap    8


 


BUN    38.6 H


 


Creatinine    2.1 H


 


Est GFR (CKD-EPI)AfAm    33.92


 


Est GFR (CKD-EPI)NonAf    29.27


 


Random Glucose    85


 


Calcium    8.7


 


Total Bilirubin    0.4


 


AST    20


 


ALT    8 L


 


Alkaline Phosphatase    147 H


 


Troponin I    < 0.02


 


B-Natriuretic Peptide    915.8 H


 


Total Protein    6.3 L


 


Albumin    2.9 L


 


Urine Color  Yellow  


 


Urine Appearance  Cloudy  


 


Urine pH  8.0  D  


 


Ur Specific [place]  1.011  


 


Urine Protein  1+ H  


 


Urine Glucose (UA)  Negative  


 


Urine Ketones  Negative  


 


Urine Blood  Negative  


 


Urine Nitrite  Positive H  


 


Urine Bilirubin  Negative  


 


Urine Urobilinogen  1.0  


 


Ur Leukocyte Esterase  3+ H  


 


Urine WBC (Auto)  118  


 


Urine RBC (Auto)  1  


 


Urine Casts (Auto)  4  


 


U Epithel Cells (Auto)  0.8  


 


Urine Bacteria (Auto)  4838.1  


 


Ur Random Creatinine   


 


Ur Random Sodium   














  09/05/19 09/05/19





  06:45 06:45


 


WBC  2.0 L 


 


RBC  2.44 L 


 


Hgb  8.0 L 


 


Hct  24.1 L 


 


MCV  98.9 H 


 


MCH  32.7 


 


MCHC  33.0 


 


RDW  19.4 H 


 


Plt Count  80 L D 


 


MPV  10.1 


 


Absolute Neuts (auto)  1.3 L 


 


Neutrophils %  66.9 


 


Neutrophils % (Manual)  


 


Band Neutrophils %  


 


Lymphocytes %  9.6  D 


 


Lymphocytes % (Manual)  


 


Monocytes %  15.6 H 


 


Monocytes % (Manual)  


 


Eosinophils %  7.3 H 


 


Eosinophils % (Manual)  


 


Basophils %  0.6 


 


Basophils % (Manual)  


 


Myelocytes % (Man)  


 


Promyelocytes % (Man)  


 


Blast Cells % (Manual)  


 


Nucleated RBC %  0 


 


Metamyelocytes  


 


Platelet Estimate  


 


Platelet Comment  


 


PT with INR  


 


INR  


 


PTT (Actin FS)  


 


Sodium   140


 


Potassium   4.2


 


Chloride   104


 


Carbon Dioxide   25


 


Anion Gap   12


 


BUN   33.0 H


 


Creatinine   1.9 H


 


Est GFR (CKD-EPI)AfAm   38.28


 


Est GFR (CKD-EPI)NonAf   33.03


 


Random Glucose   80


 


Calcium   8.1 L


 


Total Bilirubin   0.3


 


AST   8 L


 


ALT   < 6 L


 


Alkaline Phosphatase   130 H


 


Troponin I  


 


B-Natriuretic Peptide  


 


Total Protein   5.4 L


 


Albumin   2.4 L


 


Urine Color  


 


Urine Appearance  


 


Urine pH  


 


Ur Specific Gravity  


 


Urine Protein  


 


Urine Glucose (UA)  


 


Urine Ketones  


 


Urine Blood  


 


Urine Nitrite  


 


Urine Bilirubin  


 


Urine Urobilinogen  


 


Ur Leukocyte Esterase  


 


Urine WBC (Auto)  


 


Urine RBC (Auto)  


 


Urine Casts (Auto)  


 


U Epithel Cells (Auto)  


 


Urine Bacteria (Auto)  


 


Ur Random Creatinine  


 


Ur Random Sodium  








Active Medications











Generic Name Dose Route Start Last Admin





  Trade Name Freq  PRN Reason Stop Dose Admin


 


Albuterol/Ipratropium  1 amp  09/05/19 12:00  





  Duoneb -  NEB   





  RQID LAYLA   





     





     





     





     


 


Carbidopa/Levodopa  1 combo  09/04/19 22:00  09/05/19 06:23





  Sinemet *Cr* 50/200 -  PO   1 combo





  TID LAYLA   Administration





     





     





     





     


 


Docusate Sodium  100 mg  09/04/19 19:58  





  Colace -  PO   





  BID PRN   





  CONSTIPATION   





     





     





     


 


Sodium Chloride  1,000 mls @ 75 mls/hr  09/04/19 18:45  09/04/19 20:20





  Normal Saline -  IV   75 mls/hr





  ASDIR LAYLA   Administration





     





     





     





     


 


Ceftriaxone Sodium 1 gm/  50 mls @ 100 mls/hr  09/05/19 10:00  09/05/19 10:04





  Dextrose  IVPB   100 mls/hr





  DAILY LAYLA   Administration





     





     





  Protocol   





     











ASSESSMENT/PLAN:


Prerenal azotemia


Multiple myeloma


CKD stage 3


COPD





--Fluid responsive back to Cr baseline for patient


--Due to acute nature of Creatinine flux highly doubt worsening MM/amyloidosis 

injury to kidneys


--Monitor Cr


--Avoid nephrotoxic agents


--Encourage PO intake and IVF PRN


--Continue UTI treatment per primary team


Rest per primary and oncological teams





Case discussed with Dr. Sandra Muir, DO - IM PGY-3





Visit type





- Emergency Visit


Emergency Visit: Yes


ED Registration Date: 09/05/19


Care time: The patient presented to the Emergency Department on the above date 

and was hospitalized for further evaluation of their emergent condition.





- New Patient


This patient is new to me today: Yes


Date on this admission: 09/05/19





- Critical Care


Critical Care patient: No

## 2019-09-05 NOTE — PN
Progress Note, Physician


Chief Complaint: 





77 Y.O M [facility] RESIDENT WITH HISTORY OF RELAPSING MM WAS SENT TO THE THE ER [facility] 

BY DR SILVEIRA DUE TO ARF.


HIS CREAT WAS 3.1 ON 9/2/19 AT [facility] BUT AFTER ARRIVAL TO [facility]  REPEATED 

CREATININE WAS 2.1


TODAY AM CREATININE IS BACK TO 1.9 WHICH IS THE BASELINE OF HIS KIDNEY FUNCTION.


RECENTLY PATIENT'S ANTIHYPERTENSIVE MEDS WERE DE-ESCALATED DUE TO EPISODES OF 

LOW BP.


History of Present Illness: 





MM ON REVLIMID BUT RECENTLY INCREASING SPEP/IF/LC.


PANCYTOPENIA


RECENT PET/CT SHOWED NEW C7 HYPERMETABOLIC LESION, ACTIVITY IN BOWEL, LUNGS.


COPD WITH RECENT EXACERBATION


STILL SMOKES


PARKINSON'S


DEMENTIA


HTN, EKG WITH FREQUENT APC/VPC


CKD 3


REPEATED FALLS 











- Current Medication List


Current Medications: 


Active Medications





Albuterol/Ipratropium (Duoneb -)  1 amp NEB RQID LAYLA


Carbidopa/Levodopa (Sinemet *Cr* 50/200 -)  1 combo PO TID [facility]


   Last Admin: 09/05/19 06:23 Dose:  1 combo


Docusate Sodium (Colace -)  100 mg PO BID PRN


   PRN Reason: CONSTIPATION


Sodium Chloride (Normal Saline -)  1,000 mls @ 75 mls/hr IV ASDIR [facility]


   Last Admin: 09/04/19 20:20 Dose:  75 mls/hr


Ceftriaxone Sodium 1 gm/ (Dextrose)  50 mls @ 100 mls/hr IVPB DAILY [facility]; 

Protocol











- Objective


Vital Signs: 


 Vital Signs











Temperature  97.8 F   09/05/19 06:26


 


Pulse Rate  75   09/05/19 06:26


 


Respiratory Rate  20   09/05/19 06:26


 


Blood Pressure  104/46 L  09/05/19 06:26


 


O2 Sat by Pulse Oximetry (%)  95   09/05/19 03:56











Constitutional: Yes: No Distress, Calm


Eyes: Yes: Conjunctiva Clear, EOM Intact


HENT: Yes: Atraumatic, Normocephalic.  No: Drooling


Neck: Yes: Supple, Trachea Midline


Cardiovascular: Yes: Regular Rate and Rhythm.  No: Bradycardia, Tachycardia


Respiratory: Yes: Rhonchi, SOB on Exertion


Gastrointestinal: Yes: Normal Bowel Sounds, Soft, Abdomen, Obese


...Rectal Exam: Yes: Deferred


Genitourinary: No: Anuria, Bladder Distention


Breast(s): Yes: WNL


Musculoskeletal: Yes: WNL


Extremities: Yes: WNL


Edema: No


Peripheral Pulses WNL: Yes


Integumentary: Yes: WNL


Neurological: Yes: Alert, Oriented.  No: Aphasia, Dysarthria


...Motor Strength: WNL


Psychiatric: Yes: WNL


Labs: 


 CBC, BMP





 09/05/19 06:45 





 INR, PTT











INR  0.94  (0.83-1.09)   09/04/19  17:10    








 Laboratory Results - last 24 hr











  09/04/19 09/04/19 09/04/19





  14:47 15:10 15:10


 


WBC  3.1 L  


 


RBC  2.72 L  


 


Hgb  8.8 L  


 


Hct  27.0 L  


 


MCV  99.6 H  


 


MCH  32.3  


 


MCHC  32.4  


 


RDW  19.1 H  


 


Plt Count  101 L D  


 


MPV  10.3  D  


 


Absolute Neuts (auto)  2.2  


 


Neutrophils %  71.2  


 


Neutrophils % (Manual)  74.5  


 


Band Neutrophils %  0.0  


 


Lymphocytes %  7.8 L D  


 


Lymphocytes % (Manual)  7.2 L  


 


Monocytes %  14.5 H  


 


Monocytes % (Manual)  11 H  


 


Eosinophils %  6.0 H D  


 


Eosinophils % (Manual)  5.1 H  


 


Basophils %  0.5  


 


Basophils % (Manual)  0.0  


 


Myelocytes % (Man)  1  


 


Promyelocytes % (Man)  0  


 


Blast Cells % (Manual)  0  


 


Nucleated RBC %  0  


 


Metamyelocytes  1  


 


Platelet Estimate  Decreased  


 


Platelet Comment  No clumping noted  


 


PT with INR   


 


INR   


 


PTT (Actin FS)   


 


Sodium   


 


Potassium   


 


Chloride   


 


Carbon Dioxide   


 


Anion Gap   


 


BUN   


 


Creatinine   


 


Est GFR (CKD-EPI)AfAm   


 


Est GFR (CKD-EPI)NonAf   


 


Random Glucose   


 


Calcium   


 


Total Bilirubin   


 


AST   


 


ALT   


 


Alkaline Phosphatase   


 


Troponin I   


 


B-Natriuretic Peptide   


 


Total Protein   


 


Albumin   


 


Urine Color   


 


Urine Appearance   


 


Urine pH   


 


Ur Specific Gravity   


 


Urine Protein   


 


Urine Glucose (UA)   


 


Urine Ketones   


 


Urine Blood   


 


Urine Nitrite   


 


Urine Bilirubin   


 


Urine Urobilinogen   


 


Ur Leukocyte Esterase   


 


Urine WBC (Auto)   


 


Urine RBC (Auto)   


 


Urine Casts (Auto)   


 


U Epithel Cells (Auto)   


 


Urine Bacteria (Auto)   


 


Ur Random Creatinine    49.0


 


Ur Random Sodium   16 L 














  09/04/19 09/04/19 09/04/19





  15:10 17:10 17:10


 


WBC   


 


RBC   


 


Hgb   


 


Hct   


 


MCV   


 


MCH   


 


MCHC   


 


RDW   


 


Plt Count   


 


MPV   


 


Absolute Neuts (auto)   


 


Neutrophils %   


 


Neutrophils % (Manual)   


 


Band Neutrophils %   


 


Lymphocytes %   


 


Lymphocytes % (Manual)   


 


Monocytes %   


 


Monocytes % (Manual)   


 


Eosinophils %   


 


Eosinophils % (Manual)   


 


Basophils %   


 


Basophils % (Manual)   


 


Myelocytes % (Man)   


 


Promyelocytes % (Man)   


 


Blast Cells % (Manual)   


 


Nucleated RBC %   


 


Metamyelocytes   


 


Platelet Estimate   


 


Platelet Comment   


 


PT with INR   11.10 


 


INR   0.94 


 


PTT (Actin FS)   28.4 


 


Sodium    132 L


 


Potassium    5.1


 


Chloride    97 L


 


Carbon Dioxide    27


 


Anion Gap    8


 


BUN    38.6 H


 


Creatinine    2.1 H


 


Est GFR (CKD-EPI)AfAm    33.92


 


Est GFR (CKD-EPI)NonAf    29.27


 


Random Glucose    85


 


Calcium    8.7


 


Total Bilirubin    0.4


 


AST    20


 


ALT    8 L


 


Alkaline Phosphatase    147 H


 


Troponin I    < 0.02


 


B-Natriuretic Peptide    915.8 H


 


Total Protein    6.3 L


 


Albumin    2.9 L


 


Urine Color  Yellow  


 


Urine Appearance  Cloudy  


 


Urine pH  8.0  D  


 


Ur Specific [place]  1.011  


 


Urine Protein  1+ H  


 


Urine Glucose (UA)  Negative  


 


Urine Ketones  Negative  


 


Urine Blood  Negative  


 


Urine Nitrite  Positive H  


 


Urine Bilirubin  Negative  


 


Urine Urobilinogen  1.0  


 


Ur Leukocyte Esterase  3+ H  


 


Urine WBC (Auto)  118  


 


Urine RBC (Auto)  1  


 


Urine Casts (Auto)  4  


 


U Epithel Cells (Auto)  0.8  


 


Urine Bacteria (Auto)  4838.1  


 


Ur Random Creatinine   


 


Ur Random Sodium   














  09/05/19





  06:45


 


WBC 


 


RBC 


 


Hgb 


 


Hct 


 


MCV 


 


MCH 


 


MCHC 


 


RDW 


 


Plt Count 


 


MPV 


 


Absolute Neuts (auto) 


 


Neutrophils % 


 


Neutrophils % (Manual) 


 


Band Neutrophils % 


 


Lymphocytes % 


 


Lymphocytes % (Manual) 


 


Monocytes % 


 


Monocytes % (Manual) 


 


Eosinophils % 


 


Eosinophils % (Manual) 


 


Basophils % 


 


Basophils % (Manual) 


 


Myelocytes % (Man) 


 


Promyelocytes % (Man) 


 


Blast Cells % (Manual) 


 


Nucleated RBC % 


 


Metamyelocytes 


 


Platelet Estimate 


 


Platelet Comment 


 


PT with INR 


 


INR 


 


PTT (Actin FS) 


 


Sodium  140


 


Potassium  4.2


 


Chloride  104


 


Carbon Dioxide  25


 


Anion Gap  12


 


BUN  33.0 H


 


Creatinine  1.9 H


 


Est GFR (CKD-EPI)AfAm  38.28


 


Est GFR (CKD-EPI)NonAf  33.03


 


Random Glucose  80


 


Calcium  8.1 L


 


Total Bilirubin  0.3


 


AST  8 L


 


ALT  < 6 L


 


Alkaline Phosphatase  130 H


 


Troponin I 


 


B-Natriuretic Peptide 


 


Total Protein  5.4 L


 


Albumin  2.4 L


 


Urine Color 


 


Urine Appearance 


 


Urine pH 


 


Ur Specific Gravity 


 


Urine Protein 


 


Urine Glucose (UA) 


 


Urine Ketones 


 


Urine Blood 


 


Urine Nitrite 


 


Urine Bilirubin 


 


Urine Urobilinogen 


 


Ur Leukocyte Esterase 


 


Urine WBC (Auto) 


 


Urine RBC (Auto) 


 


Urine Casts (Auto) 


 


U Epithel Cells (Auto) 


 


Urine Bacteria (Auto) 


 


Ur Random Creatinine 


 


Ur Random Sodium 














Problem List





- Problems


(1) COPD (chronic obstructive pulmonary disease)


Assessment/Plan: 


CONTINUE DUONEBS


Code(s): J44.9 - CHRONIC OBSTRUCTIVE PULMONARY DISEASE, UNSPECIFIED   


Qualifiers: 


   COPD type: emphysema 





(2) Acute renal insufficiency


Assessment/Plan: 


SOBIA ON CKD APPEARS RELATED TO PRE-RENAL INSUFFICIENCY AND RESOLVED EPISODES OF 

LOW BP


LESS LIKELY MM INJURY TO KIDNEY GIVEN QUICK IMPROVEMENT.


WILL REPEAT LABS IN AM


NEPHROLOGY CONSULT


Code(s): N28.9 - DISORDER OF KIDNEY AND URETER, UNSPECIFIED   





(3) Multiple myeloma


Assessment/Plan: 


ONCOLOGY FOLLOW UP


MRI C 7 AS RECOMMENDED BY PET/CT


ESCALATING ANTI-MYELOMA TREATMENT POSSIBLY INCLUDING MONOCLONAL ANTIBODY 

CONTAINING REGIMENS.


Code(s): C90.00 - MULTIPLE MYELOMA NOT HAVING ACHIEVED REMISSION   


Qualifiers: 


   Multiple myeloma remission status: not in remission   Qualified Code(s): 

C90.00 - Multiple myeloma not having achieved remission   





(4) UTI (urinary tract infection)


Assessment/Plan: 


uRINE CULTURE-PENDING


CEFTRIAXONE STARTED.


Code(s): N39.0 - URINARY TRACT INFECTION, SITE NOT SPECIFIED   


Qualifiers: 


   Urinary tract infection type: site unspecified 





(5) HTN (hypertension)


Assessment/Plan: 


CONTROLLED NOW OFF BP MEDS


Code(s): I10 - ESSENTIAL (PRIMARY) HYPERTENSION   


Qualifiers: 


   Hypertension type: essential hypertension   Qualified Code(s): I10 - 

Essential (primary) hypertension

## 2019-09-05 NOTE — PN
Teaching Attending Note


Name of Resident: Duc Chapa





ATTENDING PHYSICIAN STATEMENT





I saw and evaluated the patient.


I reviewed the resident's note and discussed the case with the resident.


I agree with the resident's findings and plan as documented.











ASSESSMENT AND PLAN:





79 y/o patient with myeloma, parkinsons disease, HTN, CHF, admitted with 

worsening renal function/UTI.


Diuretics on hold


recent PET-CT-- indeterminate C6 lesion, dependent lung changes,  ascending 

colon polyp





will check MRI C spine


check SFLCA





IF myeloma progressing will consider switching to daratumumab

## 2019-09-05 NOTE — CONSULT
Consultation: 


REQUESTING PROVIDER: Dr. Rg





CONSULT REQUEST: We have been asked to medically evaluate this patient for 

multiple myeloma





HISTORY OF PRESENT ILLNESS:





Pleasant 78 year old male with a history of multiple myeloma, parkinson's 

disease, hypertension, COPD, RLS presented to the hospital from [facility] for 

worsened renal function tests. Patient reports no current distress and denies 

any complaints. Son at bedside states that Mr. Bella has not had an appetite 

nor an adequate fluid intake. He has been treated for multiple myeloma with 

myeloma kidney (baseline cre 1.9) by Dr. Crowell. On revlimid in the office. We 

are consulted for worsening renal function in the setting of multiple myeloma. 

Recently patient had a PET/CT which revealed a questionable lesion in C7.








REVIEW OF SYSTEMS:


CONSTITUTIONAL: 


Absent:  fever, chills, diaphoresis, generalized weakness, malaise, loss of 

appetite, weight change


HEENT: 


Absent:  rhinorrhea, nasal congestion, throat pain, throat swelling, difficulty 

swallowing, mouth swelling, ear pain, eye pain, visual changes


CARDIOVASCULAR: 


Absent: chest pain, syncope, palpitations, irregular heart rate, lightheadedness

, peripheral edema


RESPIRATORY: 


Absent: cough, shortness of breath, dyspnea with exertion, orthopnea, wheezing, 

stridor, hemoptysis


GASTROINTESTINAL:


Absent: abdominal pain, abdominal distension, nausea, vomiting, diarrhea, 

constipation, melena, hematochezia


GENITOURINARY: 


Absent: dysuria, frequency, urgency, hesitancy, hematuria, flank pain, genital 

pain


MUSCULOSKELETAL: 


Absent: myalgia, arthralgia, joint swelling, back pain, neck pain


SKIN: 


Absent: rash, itching, pallor


HEMATOLOGIC/IMMUNOLOGIC: 


Absent: easy bleeding, easy bruising, lymphadenopathy, frequent infections


ENDOCRINE:


Absent: unexplained weight gain, unexplained weight loss, heat intolerance, 

cold intolerance


NEUROLOGIC: 


Absent: headache, focal weakness or paresthesias, dizziness, unsteady gait, 

seizure, mental status changes, bladder or bowel incontinence


PSYCHIATRIC: 


Absent: anxiety, depression, suicidal or homicidal ideation, hallucinations.





PHYSICAL EXAMINATION





 Vital Signs - 24 hr











  09/04/19 09/04/19 09/05/19





  14:27 22:30 01:50


 


Temperature 98.0 F 98.2 F 98.5 F


 


Pulse Rate 68 83 80


 


Respiratory 16 20 18





Rate   


 


Blood Pressure 109/43 L 112/53 L 129/75


 


O2 Sat by Pulse 97 95 





Oximetry (%)   














  09/05/19 09/05/19 09/05/19





  03:00 03:56 06:26


 


Temperature 97.7 F  97.8 F


 


Pulse Rate 65  75


 


Respiratory 18 18 20





Rate   


 


Blood Pressure 119/78  104/46 L


 


O2 Sat by Pulse  95 





Oximetry (%)   














GENERAL: A&Ox3, no acute distress


EYES: PERRLA, EOMI


ENT: Moist mucus membranes


NECK: No JVD


LUNGS: CTA, no wheezes


HEART: RRR, no murmurs


ABDOMEN: Soft, nontender, BS present


MUSCULOSKELETAL: No CVA Tenderness


EXTREMITIES: 2+ pulses, no edema.


NEUROLOGICAL:  Cranial nerves II-XII intact. 











 Laboratory Results - last 24 hr











  09/04/19 09/04/19 09/04/19





  14:47 15:10 15:10


 


WBC  3.1 L  


 


RBC  2.72 L  


 


Hgb  8.8 L  


 


Hct  27.0 L  


 


MCV  99.6 H  


 


MCH  32.3  


 


MCHC  32.4  


 


RDW  19.1 H  


 


Plt Count  101 L D  


 


MPV  10.3  D  


 


Absolute Neuts (auto)  2.2  


 


Neutrophils %  71.2  


 


Neutrophils % (Manual)  74.5  


 


Band Neutrophils %  0.0  


 


Lymphocytes %  7.8 L D  


 


Lymphocytes % (Manual)  7.2 L  


 


Monocytes %  14.5 H  


 


Monocytes % (Manual)  11 H  


 


Eosinophils %  6.0 H D  


 


Eosinophils % (Manual)  5.1 H  


 


Basophils %  0.5  


 


Basophils % (Manual)  0.0  


 


Myelocytes % (Man)  1  


 


Promyelocytes % (Man)  0  


 


Blast Cells % (Manual)  0  


 


Nucleated RBC %  0  


 


Metamyelocytes  1  


 


Platelet Estimate  Decreased  


 


Platelet Comment  No clumping noted  


 


PT with INR   


 


INR   


 


PTT (Actin FS)   


 


Sodium   


 


Potassium   


 


Chloride   


 


Carbon Dioxide   


 


Anion Gap   


 


BUN   


 


Creatinine   


 


Est GFR (CKD-EPI)AfAm   


 


Est GFR (CKD-EPI)NonAf   


 


Random Glucose   


 


Calcium   


 


Total Bilirubin   


 


AST   


 


ALT   


 


Alkaline Phosphatase   


 


Troponin I   


 


B-Natriuretic Peptide   


 


Total Protein   


 


Albumin   


 


Urine Color   


 


Urine Appearance   


 


Urine pH   


 


Ur Specific Gravity   


 


Urine Protein   


 


Urine Glucose (UA)   


 


Urine Ketones   


 


Urine Blood   


 


Urine Nitrite   


 


Urine Bilirubin   


 


Urine Urobilinogen   


 


Ur Leukocyte Esterase   


 


Urine WBC (Auto)   


 


Urine RBC (Auto)   


 


Urine Casts (Auto)   


 


U Epithel Cells (Auto)   


 


Urine Bacteria (Auto)   


 


Ur Random Creatinine    49.0


 


Ur Random Sodium   16 L 














  09/04/19 09/04/19 09/04/19





  15:10 17:10 17:10


 


WBC   


 


RBC   


 


Hgb   


 


Hct   


 


MCV   


 


MCH   


 


MCHC   


 


RDW   


 


Plt Count   


 


MPV   


 


Absolute Neuts (auto)   


 


Neutrophils %   


 


Neutrophils % (Manual)   


 


Band Neutrophils %   


 


Lymphocytes %   


 


Lymphocytes % (Manual)   


 


Monocytes %   


 


Monocytes % (Manual)   


 


Eosinophils %   


 


Eosinophils % (Manual)   


 


Basophils %   


 


Basophils % (Manual)   


 


Myelocytes % (Man)   


 


Promyelocytes % (Man)   


 


Blast Cells % (Manual)   


 


Nucleated RBC %   


 


Metamyelocytes   


 


Platelet Estimate   


 


Platelet Comment   


 


PT with INR   11.10 


 


INR   0.94 


 


PTT (Actin FS)   28.4 


 


Sodium    132 L


 


Potassium    5.1


 


Chloride    97 L


 


Carbon Dioxide    27


 


Anion Gap    8


 


BUN    38.6 H


 


Creatinine    2.1 H


 


Est GFR (CKD-EPI)AfAm    33.92


 


Est GFR (CKD-EPI)NonAf    29.27


 


Random Glucose    85


 


Calcium    8.7


 


Total Bilirubin    0.4


 


AST    20


 


ALT    8 L


 


Alkaline Phosphatase    147 H


 


Troponin I    < 0.02


 


B-Natriuretic Peptide    915.8 H


 


Total Protein    6.3 L


 


Albumin    2.9 L


 


Urine Color  Yellow  


 


Urine Appearance  Cloudy  


 


Urine pH  8.0  D  


 


Ur Specific [place]  1.011  


 


Urine Protein  1+ H  


 


Urine Glucose (UA)  Negative  


 


Urine Ketones  Negative  


 


Urine Blood  Negative  


 


Urine Nitrite  Positive H  


 


Urine Bilirubin  Negative  


 


Urine Urobilinogen  1.0  


 


Ur Leukocyte Esterase  3+ H  


 


Urine WBC (Auto)  118  


 


Urine RBC (Auto)  1  


 


Urine Casts (Auto)  4  


 


U Epithel Cells (Auto)  0.8  


 


Urine Bacteria (Auto)  4838.1  


 


Ur Random Creatinine   


 


Ur Random Sodium   














  09/05/19 09/05/19





  06:45 06:45


 


WBC  2.0 L 


 


RBC  2.44 L 


 


Hgb  8.0 L 


 


Hct  24.1 L 


 


MCV  98.9 H 


 


MCH  32.7 


 


MCHC  33.0 


 


RDW  19.4 H 


 


Plt Count  80 L D 


 


MPV  10.1 


 


Absolute Neuts (auto)  1.3 L 


 


Neutrophils %  66.9 


 


Neutrophils % (Manual)  


 


Band Neutrophils %  


 


Lymphocytes %  9.6  D 


 


Lymphocytes % (Manual)  


 


Monocytes %  15.6 H 


 


Monocytes % (Manual)  


 


Eosinophils %  7.3 H 


 


Eosinophils % (Manual)  


 


Basophils %  0.6 


 


Basophils % (Manual)  


 


Myelocytes % (Man)  


 


Promyelocytes % (Man)  


 


Blast Cells % (Manual)  


 


Nucleated RBC %  0 


 


Metamyelocytes  


 


Platelet Estimate  


 


Platelet Comment  


 


PT with INR  


 


INR  


 


PTT (Actin FS)  


 


Sodium   140


 


Potassium   4.2


 


Chloride   104


 


Carbon Dioxide   25


 


Anion Gap   12


 


BUN   33.0 H


 


Creatinine   1.9 H


 


Est GFR (CKD-EPI)AfAm   38.28


 


Est GFR (CKD-EPI)NonAf   33.03


 


Random Glucose   80


 


Calcium   8.1 L


 


Total Bilirubin   0.3


 


AST   8 L


 


ALT   < 6 L


 


Alkaline Phosphatase   130 H


 


Troponin I  


 


B-Natriuretic Peptide  


 


Total Protein   5.4 L


 


Albumin   2.4 L


 


Urine Color  


 


Urine Appearance  


 


Urine pH  


 


Ur Specific Gravity  


 


Urine Protein  


 


Urine Glucose (UA)  


 


Urine Ketones  


 


Urine Blood  


 


Urine Nitrite  


 


Urine Bilirubin  


 


Urine Urobilinogen  


 


Ur Leukocyte Esterase  


 


Urine WBC (Auto)  


 


Urine RBC (Auto)  


 


Urine Casts (Auto)  


 


U Epithel Cells (Auto)  


 


Urine Bacteria (Auto)  


 


Ur Random Creatinine  


 


Ur Random Sodium  








Active Medications











Generic Name Dose Route Start Last Admin





  Trade Name Freq  PRN Reason Stop Dose Admin


 


Albuterol/Ipratropium  1 amp  09/05/19 12:00  





  Duoneb -  NEB   





  RQID LAYLA   





     





     





     





     


 


Carbidopa/Levodopa  1 combo  09/04/19 22:00  09/05/19 06:23





  Sinemet *Cr* 50/200 -  PO   1 combo





  TID LAYLA   Administration





     





     





     





     


 


Docusate Sodium  100 mg  09/04/19 19:58  





  Colace -  PO   





  BID PRN   





  CONSTIPATION   





     





     





     


 


Sodium Chloride  1,000 mls @ 75 mls/hr  09/04/19 18:45  09/04/19 20:20





  Normal Saline -  IV   75 mls/hr





  ASDIR LAYLA   Administration





     





     





     





     


 


Ceftriaxone Sodium 1 gm/  50 mls @ 100 mls/hr  09/05/19 10:00  09/05/19 10:04





  Dextrose  IVPB   100 mls/hr





  DAILY LAYLA   Administration





     





     





  Protocol   





     











ASSESSMENT/PLAN:





78 year old male with a history of multiple myeloma, parkinson's disease, 

hypertension, COPD, RLS presented to the hospital from [facility] for worsened 

renal function tests. Patient reports no current distress and denies any 

complaints.





#Multiple Myeloma


#Acute Renal Failure





#Multiple Myeloma: on revlimid


-will get serum free light chains to assess free kappa to lambda ratio for 

progression


-currently on revlimid 


-MRI of C spine to evaluate C7 lesion





#Acute Renal Failure: likely 2/2 poor hydration, resolved


-renal US negative for hydro





Duc Chapa D.O., PGY-3


Will Discuss with Dr. Crowell











ATTENDING PHYSICIAN STATEMENT





I saw and evaluated the patient.


I reviewed the resident's note and discussed the case with the resident.


I agree with the resident's findings and plan as documented.








SUBJECTIVE:








OBJECTIVE:








ASSESSMENT AND PLAN:

## 2019-09-06 VITALS — TEMPERATURE: 98.4 F

## 2019-09-06 VITALS — HEART RATE: 83 BPM

## 2019-09-06 VITALS — DIASTOLIC BLOOD PRESSURE: 46 MMHG | SYSTOLIC BLOOD PRESSURE: 110 MMHG

## 2019-09-06 LAB
ALBUMIN SERPL-MCNC: 2.2 G/DL (ref 3.4–5)
ALP SERPL-CCNC: 115 U/L (ref 45–117)
ALT SERPL-CCNC: < 6 U/L (ref 13–61)
ANION GAP SERPL CALC-SCNC: 7 MMOL/L (ref 8–16)
AST SERPL-CCNC: 7 U/L (ref 15–37)
BILIRUB SERPL-MCNC: 0.2 MG/DL (ref 0.2–1)
BUN SERPL-MCNC: 22.8 MG/DL (ref 7–18)
CALCIUM SERPL-MCNC: 8 MG/DL (ref 8.5–10.1)
CHLORIDE SERPL-SCNC: 111 MMOL/L (ref 98–107)
CO2 SERPL-SCNC: 24 MMOL/L (ref 21–32)
CREAT SERPL-MCNC: 1.6 MG/DL (ref 0.55–1.3)
GLUCOSE SERPL-MCNC: 90 MG/DL (ref 74–106)
KAPPA LC FREE SER-MCNC: 124.4 MG/L (ref 3.3–19.4)
POTASSIUM SERPLBLD-SCNC: 3.9 MMOL/L (ref 3.5–5.1)
PROT SERPL-MCNC: 4.9 G/DL (ref 6.4–8.2)
SODIUM SERPL-SCNC: 141 MMOL/L (ref 136–145)

## 2019-09-06 RX ADMIN — CEFTRIAXONE SCH MLS/HR: 1 INJECTION, POWDER, FOR SOLUTION INTRAMUSCULAR; INTRAVENOUS at 09:38

## 2019-09-06 RX ADMIN — IPRATROPIUM BROMIDE AND ALBUTEROL SULFATE SCH AMP: .5; 3 SOLUTION RESPIRATORY (INHALATION) at 15:58

## 2019-09-06 RX ADMIN — IPRATROPIUM BROMIDE AND ALBUTEROL SULFATE SCH AMP: .5; 3 SOLUTION RESPIRATORY (INHALATION) at 11:40

## 2019-09-06 RX ADMIN — IPRATROPIUM BROMIDE AND ALBUTEROL SULFATE SCH: .5; 3 SOLUTION RESPIRATORY (INHALATION) at 07:45

## 2019-09-06 NOTE — PN
Progress Note (short form)





- Note


Progress Note: 





Feels well, no complaints


Dr Crowell consult read and greatly appreciated.


Kidney function significantly improved and remain stable.


C-spine MRI is planned for C 7 lesion and will arrange as outpatient.


Will follow up with Alessandro Crowell re further MM treatment.


Patient is not a candidate for HCT due to age and performance satus, DRd or DVd

  will be determined by oncology during f/u


Will follow Plan D/c to [facility]


MRI C-spine as outptBMP, CBC as outpatient.


PE


Urine culture negative to date.





 Vital Signs











Temp  97.7 F   09/06/19 06:00


 


Pulse  68   09/06/19 06:00


 


Resp  16   09/06/19 06:00


 


BP  117/50 L  09/06/19 06:00


 


Pulse Ox  95   09/06/19 06:00








 Intake & Output











 09/05/19 09/05/19 09/06/19





 11:59 23:59 11:59


 


Intake Total 250 830 300


 


Balance 250 830 300


 


Intake:   


 


  IV 0 300 


 


    Normal Saline - 1,000 ml  300 





    @ 75 mls/hr IV ASDIR LAYLA   





    Rx#:[id number]   


 


    saline lock 0  


 


  Oral 250 530 300


 


Other:   


 


  Voiding Method Urinal Urinal Urinal


 


  Bowel Movement No No 








Neck supple, no JVD


Lungs B/l rhonchi


Heart S1s2 regular


Abdomen obese, no HSM


Ext no CCE


 Laboratory Results - last 24 hr











  09/05/19 09/06/19





  06:45 06:45


 


WBC  2.0 L 


 


RBC  2.44 L 


 


Hgb  8.0 L 


 


Hct  24.1 L 


 


MCV  98.9 H 


 


MCH  32.7 


 


MCHC  33.0 


 


RDW  19.4 H 


 


Plt Count  80 L D 


 


MPV  10.1 


 


Absolute Neuts (auto)  1.3 L 


 


Neutrophils %  66.9 


 


Neutrophils % (Manual)  71.0 


 


Band Neutrophils %  4.0 


 


Lymphocytes %  9.6  D 


 


Lymphocytes % (Manual)  17.0  D 


 


Monocytes %  15.6 H 


 


Monocytes % (Manual)  5 


 


Eosinophils %  7.3 H 


 


Eosinophils % (Manual)  3.0 


 


Basophils %  0.6 


 


Basophils % (Manual)  0.0 


 


Myelocytes % (Man)  0  D 


 


Promyelocytes % (Man)  0 


 


Blast Cells % (Manual)  0 


 


Nucleated RBC %  0 


 


Metamyelocytes  0  D 


 


Hypochromia  0 


 


Platelet Estimate  Decreased 


 


Platelet Comment  Large platelets 


 


Polychromasia  0 


 


Poikilocytosis  1+ 


 


Anisocytosis  1+ 


 


Microcytosis  0 


 


Macrocytosis  1+ 


 


Ovalocytes  1+ 


 


Sodium   141


 


Potassium   3.9


 


Chloride   111 H


 


Carbon Dioxide   24


 


Anion Gap   7 L


 


BUN   22.8 H


 


Creatinine   1.6 H


 


Est GFR (CKD-EPI)AfAm   47.12


 


Est GFR (CKD-EPI)NonAf   40.66


 


Random Glucose   90


 


Calcium   8.0 L


 


Total Bilirubin   0.2


 


AST   7 L


 


ALT   < 6 L


 


Alkaline Phosphatase   115


 


Total Protein   4.9 L


 


Albumin   2.2 L














 Current Active Problems











Problem Status Onset


 


COPD (chronic obstructive pulmonary disease) Acute 


 


Renal insufficiency Acute 


 


UTI (urinary tract infection) Acute 








Plan D/C to [facility]


MRI C-spine as outpatient


discussed with the patient


Avoid hypotension


Maintain proper hydration.


Received 3 days of Ceftriaxone IV


Will follow UA, C/C


Light chains were sent, pending








Problem List





- Problems


(1) COPD (chronic obstructive pulmonary disease)


Code(s): J44.9 - CHRONIC OBSTRUCTIVE PULMONARY DISEASE, UNSPECIFIED   


Qualifiers: 


   COPD type: emphysema 





(2) Acute renal insufficiency


Code(s): N28.9 - DISORDER OF KIDNEY AND URETER, UNSPECIFIED   





(3) Multiple myeloma


Code(s): C90.00 - MULTIPLE MYELOMA NOT HAVING ACHIEVED REMISSION   


Qualifiers: 


   Multiple myeloma remission status: not in remission   Qualified Code(s): 

C90.00 - Multiple myeloma not having achieved remission   





(4) UTI (urinary tract infection)


Code(s): N39.0 - URINARY TRACT INFECTION, SITE NOT SPECIFIED   


Qualifiers: 


   Urinary tract infection type: site unspecified 





(5) HTN (hypertension)


Code(s): I10 - ESSENTIAL (PRIMARY) HYPERTENSION   


Qualifiers: 


   Hypertension type: essential hypertension   Qualified Code(s): I10 - 

Essential (primary) hypertension

## 2019-09-06 NOTE — PN
Progress Note, Physician


History of Present Illness: 





Pt seen and examined at bedside. He is awake and alert. He denies shortness of 

breath. He denies fevers or chills.





- Current Medication List


Current Medications: 


Active Medications





Albuterol/Ipratropium (Duoneb -)  1 amp NEB RQID LAYLA


   Last Admin: 09/06/19 11:40 Dose:  1 amp


Carbidopa/Levodopa (Sinemet *Cr* 50/200 -)  1 combo PO TID LAYLA


   Last Admin: 09/06/19 05:00 Dose:  1 combo


Docusate Sodium (Colace -)  100 mg PO BID PRN


   PRN Reason: CONSTIPATION


Sodium Chloride (Normal Saline -)  1,000 mls @ 75 mls/hr IV ASDIR LAYLA


   Last Admin: 09/05/19 20:37 Dose:  Not Given


Ceftriaxone Sodium 1 gm/ (Dextrose)  50 mls @ 100 mls/hr IVPB DAILY LAYLA; 

Protocol


   Last Admin: 09/06/19 09:38 Dose:  100 mls/hr











- Objective


Vital Signs: 


 Vital Signs











Temperature  98.6 F   09/06/19 14:57


 


Pulse Rate  83   09/06/19 14:57


 


Respiratory Rate  18   09/06/19 14:57


 


Blood Pressure  99/58 L  09/06/19 14:57


 


O2 Sat by Pulse Oximetry (%)  95   09/06/19 06:00











Constitutional: Yes: Calm


Eyes: Yes: Conjunctiva Clear


HENT: Yes: Atraumatic


Neck: Yes: Supple


Cardiovascular: Yes: S1, S2


Respiratory: Yes: CTA Bilaterally


Gastrointestinal: Yes: Soft


Genitourinary: Yes: WNL


Musculoskeletal: Yes: WNL


Edema: No


Neurological: Yes: Oriented


Psychiatric: Yes: Oriented


Labs: 


 CBC, BMP





 09/05/19 06:45 





 09/06/19 06:45 





 INR, PTT











INR  0.94  (0.83-1.09)   09/04/19  17:10    














Assessment/Plan





 Current Medications











Generic Name Dose Route Start Last Admin





  Trade Name Freq  PRN Reason Stop Dose Admin


 


Albuterol/Ipratropium  1 amp  09/05/19 12:00  09/06/19 11:40





  Duoneb -  NEB   1 amp





  RQID LAYLA   Administration





     





     





     





     


 


Carbidopa/Levodopa  1 combo  09/04/19 22:00  09/06/19 05:00





  Sinemet *Cr* 50/200 -  PO   1 combo





  TID LAYLA   Administration





     





     





     





     


 


Docusate Sodium  100 mg  09/04/19 19:58  





  Colace -  PO   





  BID PRN   





  CONSTIPATION   





     





     





     


 


Sodium Chloride  1,000 mls @ 75 mls/hr  09/04/19 18:45  09/05/19 20:37





  Normal Saline -  IV   Not Given





  ASDIR LAYLA   





     





     





     





     


 


Ceftriaxone Sodium 1 gm/  50 mls @ 100 mls/hr  09/05/19 10:00  09/06/19 09:38





  Dextrose  IVPB   100 mls/hr





  DAILY LAYLA   Administration





     





     





  Protocol   





     











Impression


1. ckd


2. UTI


3. HTN


4. cataract


5. urinary obstruction/strictures


6. multiple myeloma





Plan


- crt is improved


- can d/c fluids


- abx per primary team


- can see in office


- will follow PRN

## 2019-09-06 NOTE — PN
Progress Note (short form)





- Note


Progress Note: 





Patient seen and examined





Denies any complaints





 Last Vital Signs











Temp Pulse Resp BP Pulse Ox


 


 98.4 F   83   20   110/46 L  95 


 


 09/06/19 17:24  09/06/19 17:24  09/06/19 17:24  09/06/19 17:24  09/06/19 06:00








Cor: RSR, No murmurs, No gallops


Lungs: Clear to P&A


Abd: Soft, Normal bowel sounds, No organomegaly


Ext:No significant edema








 Abnormal Lab Results











  09/05/19 09/06/19





  06:45 06:45


 


Chloride   111 H


 


Anion Gap   7 L


 


BUN   22.8 H


 


Creatinine   1.6 H


 


Calcium   8.0 L


 


AST   7 L


 


ALT   < 6 L


 


Total Protein   4.9 L


 


Albumin   2.2 L


 


Free Kappa LC, Quant  124.4 H 


 


Free Lambda LC, Quant  599.9 H 


 


Free Kappa/Lambda Ratio  0.21 L 








Active Medications











Generic Name Dose Route Start Last Admin





  Trade Name Freq  PRN Reason Stop Dose Admin


 


Albuterol/Ipratropium  1 amp  09/05/19 12:00  09/06/19 15:58





  Duoneb -  NEB   1 amp





  RQID LAYLA   Administration





     





     





     





     


 


Carbidopa/Levodopa  1 combo  09/04/19 22:00  09/06/19 16:40





  Sinemet *Cr* 50/200 -  PO   1 combo





  TID LAYLA   Administration





     





     





     





     


 


Docusate Sodium  100 mg  09/04/19 19:58  





  Colace -  PO   





  BID PRN   





  CONSTIPATION   





     





     





     


 


Sodium Chloride  1,000 mls @ 75 mls/hr  09/04/19 18:45  09/05/19 20:37





  Normal Saline -  IV   Not Given





  ASDIR LAYLA   





     





     





     





     


 


Ceftriaxone Sodium 1 gm/  50 mls @ 100 mls/hr  09/05/19 10:00  09/06/19 09:38





  Dextrose  IVPB   100 mls/hr





  DAILY LAYLA   Administration





     





     





  Protocol   





     








A/P





77 y/o patient with myeloma, parkinsons disease, HTN, CHF, admitted with 

worsening renal function/UTI.


Diuretics on hold


recent PET-CT-- indeterminate C6 lesion, dependent lung changes,  ascending 

colon polyp





renal function improved with holding diuretic/gentle hydration





Monitor for now





continue revlimid 10mg daily for 21 days on/ 7 days off





Follow up closely as outpatient





f/u MRI C spine





Monotor CBC/CMP weekly as outpatient

## 2019-09-06 NOTE — DS
Physical Examination


Vital Signs: 


 Vital Signs











Temperature  97.7 F   09/06/19 06:00


 


Pulse Rate  68   09/06/19 06:00


 


Respiratory Rate  16   09/06/19 06:00


 


Blood Pressure  117/50 L  09/06/19 06:00


 


O2 Sat by Pulse Oximetry (%)  95   09/06/19 06:00











Constitutional: Yes: No Distress, Calm


Eyes: Yes: Conjunctiva Clear, EOM Intact


HENT: Yes: Atraumatic, Normocephalic


Neck: Yes: Supple, Trachea Midline


Cardiovascular: Yes: Regular Rate and Rhythm


Respiratory: Yes: Regular, CTA Bilaterally, Rhonchi, SOB on Exertion


Gastrointestinal: Yes: Normal Bowel Sounds, Soft


...Rectal Exam: Yes: Deferred


Renal/: No: Anuria


Breast(s): Yes: WNL


Musculoskeletal: Yes: WNL


Edema: No


Integumentary: Yes: WNL


Neurological: Yes: Alert, Oriented


...Motor Strength: WNL


Psychiatric: Yes: WNL


Labs: 


 CBC, BMP





 09/05/19 06:45 





 09/06/19 06:45 











Discharge Summary


Reason For Visit: RENAL INSUFFICENCY, MULTIPLE MYELOMA


Current Active Problems





COPD (chronic obstructive pulmonary disease) (Acute)


Renal insufficiency (Acute)


UTI (urinary tract infection) (Acute)








Condition: Stable





- Instructions


Disposition: SKILLED NURSING FACILITY





- Home Medications


Comprehensive Discharge Medication List: 


Ambulatory Orders





Aspirin [Aspirin EC] 81 mg PO DAILY 11/30/16 


Omeprazole 10 mg PO DAILY 11/30/16 


Pramipexole Dihydrochloride [Mirapex -] 0.25 mg PO TID 12/06/16 


Docusate Sodium [Colace -] 100 mg PO BID PRN #28 capsule 12/12/16 


Acetaminophen 650 mg PO QID PRN 02/11/19 


Amlodipine Besylate [Norvasc -] 10 mg PO DAILY 02/11/19 


Carbidopa/Levodopa *Cr* 50/200 [Sinemet *Cr* 50/200 -] 1 combo PO TID 02/11/19 


Clindamycin [Cleocin -] 450 mg PO Q6HPO #28 capsule 02/11/19 


Cyanocobalamin Vit B-12 Inj. [Redisol] 1,000 mcg IM MONTHLY 02/11/19 


Furosemide [Lasix -] 20 mg PO DAILY 02/11/19 


Lenalidomide [Revlimid] 10 mg PO ASDIR 02/11/19 


Multivitamin,Ther and Minerals [Vitamin and Minerals] 1 each PO DAILY 02/11/19 


Rivastigmine Tartrate [Rivastigmine] 3 mg PO TID 02/11/19 


Spironolactone [Aldactone] 25 mg PO DAILY 02/11/19 


Valacyclovir HCl [Valtrex] 500 mg PO Q48H 02/11/19

## 2019-09-18 ENCOUNTER — HOSPITAL ENCOUNTER (INPATIENT)
Dept: HOSPITAL 74 - JER | Age: 79
LOS: 1 days | Discharge: NURSING, REHAB OR LONG-TERM CARE | DRG: 812 | End: 2019-09-19
Payer: COMMERCIAL

## 2019-09-18 VITALS — BODY MASS INDEX: 23.8 KG/M2

## 2019-09-18 DIAGNOSIS — C90.00: ICD-10-CM

## 2019-09-18 DIAGNOSIS — I12.9: ICD-10-CM

## 2019-09-18 DIAGNOSIS — F02.80: ICD-10-CM

## 2019-09-18 DIAGNOSIS — G20: ICD-10-CM

## 2019-09-18 DIAGNOSIS — F17.210: ICD-10-CM

## 2019-09-18 DIAGNOSIS — D64.9: Primary | ICD-10-CM

## 2019-09-18 DIAGNOSIS — N18.3: ICD-10-CM

## 2019-09-18 DIAGNOSIS — J44.9: ICD-10-CM

## 2019-09-18 LAB
ALBUMIN SERPL-MCNC: 2.5 G/DL (ref 3.4–5)
ALP SERPL-CCNC: 136 U/L (ref 45–117)
ALT SERPL-CCNC: 7 U/L (ref 13–61)
ANION GAP SERPL CALC-SCNC: 0 MMOL/L (ref 8–16)
AST SERPL-CCNC: 6 U/L (ref 15–37)
BASOPHILS # BLD: 1.3 % (ref 0–2)
BILIRUB DIRECT SERPL-MCNC: 126 U/L (ref 87–246)
BILIRUB SERPL-MCNC: 0.3 MG/DL (ref 0.2–1)
BUN SERPL-MCNC: 14.4 MG/DL (ref 7–18)
CALCIUM SERPL-MCNC: 8.5 MG/DL (ref 8.5–10.1)
CHLORIDE SERPL-SCNC: 109 MMOL/L (ref 98–107)
CO2 SERPL-SCNC: 32 MMOL/L (ref 21–32)
CREAT SERPL-MCNC: 1.2 MG/DL (ref 0.55–1.3)
DEPRECATED RDW RBC AUTO: 20.2 % (ref 11.9–15.9)
EOSINOPHIL # BLD: 1.3 % (ref 0–4.5)
GLUCOSE SERPL-MCNC: 84 MG/DL (ref 74–106)
HCT VFR BLD CALC: 24.1 % (ref 35.4–49)
HGB BLD-MCNC: 7.9 GM/DL (ref 11.7–16.9)
INR BLD: 1 (ref 0.83–1.09)
IRON SERPL-MCNC: 49 UG/DL (ref 50–175)
LYMPHOCYTES # BLD: 12.4 % (ref 8–40)
MCH RBC QN AUTO: 33.2 PG (ref 25.7–33.7)
MCHC RBC AUTO-ENTMCNC: 32.9 G/DL (ref 32–35.9)
MCV RBC: 100.7 FL (ref 80–96)
MONOCYTES # BLD AUTO: 10.4 % (ref 3.8–10.2)
NEUTROPHILS # BLD: 74.6 % (ref 42.8–82.8)
PLATELET # BLD AUTO: 112 K/MM3 (ref 134–434)
PMV BLD: 8.3 FL (ref 7.5–11.1)
POTASSIUM SERPLBLD-SCNC: 4.9 MMOL/L (ref 3.5–5.1)
PROT SERPL-MCNC: 5.8 G/DL (ref 6.4–8.2)
PT PNL PPP: 11.8 SEC (ref 9.7–13)
RBC # BLD AUTO: 2.39 M/MM3 (ref 4–5.6)
SODIUM SERPL-SCNC: 142 MMOL/L (ref 136–145)
TIBC SERPL-MCNC: 277 UG/DL (ref 250–450)
WBC # BLD AUTO: 4 K/MM3 (ref 4–10)

## 2019-09-18 PROCEDURE — 30233N1 TRANSFUSION OF NONAUTOLOGOUS RED BLOOD CELLS INTO PERIPHERAL VEIN, PERCUTANEOUS APPROACH: ICD-10-PCS

## 2019-09-18 PROCEDURE — P9058 RBC, L/R, CMV-NEG, IRRAD: HCPCS

## 2019-09-18 PROCEDURE — P9038 RBC IRRADIATED: HCPCS

## 2019-09-18 RX ADMIN — IPRATROPIUM BROMIDE AND ALBUTEROL SULFATE SCH AMP: .5; 3 SOLUTION RESPIRATORY (INHALATION) at 21:15

## 2019-09-18 RX ADMIN — IPRATROPIUM BROMIDE AND ALBUTEROL SULFATE SCH AMP: .5; 3 SOLUTION RESPIRATORY (INHALATION) at 17:37

## 2019-09-18 NOTE — PDOC
Documentation entered by Aimee Case SCRIBE, acting as scribe for Madie Patterson MD.








Madie Patterson MD:  This documentation has been prepared by the Evie ortiz Adrianna, SCRIBE, under my direction and personally reviewed by me in its 

entirety.  I confirm that the documentation accurately reflects all work, 

treatment, procedures, and medical decision making performed by me.  





History of Present Illness





- General


Stated Complaint: Blood Transfusion


Time Seen by Provider: 09/18/19 09:35


History Source: Patient, Family


Exam Limitations: No Limitations





- History of Present Illness


Initial Comments: 


The patient is a 78 year old male, with a significant PMH of Multiple Myeloma, 

parkinsonian features, urethral strictures, chronic renal failure and HTN, who 

[facility]. Patient was sent 

to [facility] for a transfusion as he was found to have a hemoglobin of 6.1 and a 

hematocrit of 20. Patient endorses some new onset RLE edema, but denies pain. 

He notes he has a chronic cough that is secondary to being a smoker. Patient is 

otherwise asymptomatic, and is able to properly answer questions.





Denies fevers, chills, chest pain, SOB, diarrhea, constipation, dysuria, 

hematuria, blood in stool, melena, headache, nausea, vomiting, blurry vision, 

abdominal pain.





Allergies: NKA, NKDA


Surgical History: None reported 


Social History: Current smoker (smokes a few cigarettes per day, but used to 

smoke more). [facility] resident. Able to ambulate with a walker at baseline. 


PCP: Dr. Rg


Oncologist: Dr. Crowell





Past History





- Past Medical History


Allergies/Adverse Reactions: 


 Allergies











Allergy/AdvReac Type Severity Reaction Status Date / Time


 


No Known Allergies Allergy   Verified 09/18/19 09:47











Home Medications: 


Ambulatory Orders





Docusate Sodium [Colace -] 100 mg PO BID PRN #28 capsule 12/12/16 


Acetaminophen 650 mg PO QID PRN 02/11/19 


Carbidopa/Levodopa *Cr* 50/200 [Sinemet *Cr* 50/200 -] 1 combo PO TID 02/11/19 


Cyanocobalamin Vit B-12 Inj. [Vitamin B12 Injection -] 1,000 mcg IM MONTHLY 02/ 11/19 


Multivitamin,Ther and Minerals [Vitamin and Minerals] 1 each PO DAILY 02/11/19 


Ipratropium/Albuterol Sulfate [Iprat-Albut 0.5-3(2.5) mg/3 ml] 3 ml IH PRN 09/18 /19 








Anemia: No


Asthma: No


Cancer: Yes (Multiple myeloma)


Cardiac Disorders: No


CVA: No


COPD: Yes (?-ON INHALER-SMOKES 1 PPD)


CHF: No


Dementia: No


Diabetes: No


GI Disorders: No


 Disorders: No


HTN: Yes (DX 4/2014)


Hypercholesterolemia: No


Liver Disease: No


Seizures: No


Thyroid Disease: No





- Surgical History


Abdominal Surgery: No


Appendectomy: No


Cardiac Surgery: No


Cholecystectomy: No


Lung Surgery: No


Neurologic Surgery: No


Orthopedic Surgery: No





- Suicide/Smoking/Psychosocial Hx


Smoking History: Current some day smoker


Have you smoked in the past 12 months: No


Number of Cigarettes Smoked Daily: 1


'Breaking Loose' booklet given: 08/26/15


Hx Alcohol Use: No


Drug/Substance Use Hx: No


Substance Use Type: None


Hx Substance Use Treatment: No





**Review of Systems





- Review of Systems


Comments:: 


GENERAL/CONSTITUTIONAL: No fever or chills. No weakness.


HEAD, EYES, EARS, NOSE AND THROAT: No change in vision. No ear pain or 

discharge. No sore throat.


CARDIOVASCULAR: No chest pain or shortness of breath.


RESPIRATORY: +Chronic cough (2/2 smoking). No wheezing or hemoptysis.


GASTROINTESTINAL: No nausea, vomiting, diarrhea or constipation.


GENITOURINARY: No dysuria, frequency, or change in urination.


MUSCULOSKELETAL: +RLE edema. No joint or muscle pain. No neck or back pain.


SKIN: No rash


NEUROLOGIC: No headache, vertigo, loss of consciousness, or change in strength/

sensation.


ENDOCRINE: No increased thirst. No abnormal weight change.


HEMATOLOGIC/LYMPHATIC: No anemia, easy bleeding, or history of blood clots.


ALLERGIC/IMMUNOLOGIC: No hives or skin allergy.








*Physical Exam





- Vital Signs


 Last Vital Signs











Temp Pulse Resp BP Pulse Ox


 


 97.4 F L  78   18   130/49 L  100 


 


 09/18/19 09:44  09/18/19 09:44  09/18/19 09:44  09/18/19 09:44  09/18/19 09:44














- Physical Exam


Comments: 


GENERAL: The patient is in no acute distress.


HEAD: Normal with no signs of trauma.


EYES: +Pale conjunctiva. PERRLA, EOMI, sclera anicteric.


ENT: Ears normal, nares patent, oropharynx clear without exudates. Moist mucous 

membranes.


NECK: Normal range of motion, supple without lymphadenopathy, JVD, or masses.


LUNGS: +Rhoncuris breath sounds. +Upper airway with transmitted respiratory 

sounds. Breath sounds equal bilaterally.


HEART:Regular rate and rhythm, normal S1 and S2 without murmur, rub or gallop.


ABDOMEN: Soft, nontender, normoactive bowel sounds. No guarding, no rebound. No 

masses palpable.


EXTREMITIES: +RLE edema. Normal range of motion. No clubbing or cyanosis. No 

erythema, or tenderness.


NEUROLOGICAL: Cranial nerves II through XII grossly intact. Normal speech. No 

focal neurological deficits.


MUSCULOSKELETAL: Back nontender to palpation, no CVA tenderness


SKIN: Warm, Dry, normal turgor, no rashes or lesions noted.





ED Treatment Course





- LABORATORY


CBC & Chemistry Diagram: 


 09/18/19 10:20





 09/18/19 10:20





- RADIOLOGY


Radiograph Interpretation: 


EXAM#: TYPE/EXAM: RESULT: [id number] RAD/CHEST X-RAY PORTABLE* Preadmission. 


Impression. No evidence of active pulmonary disease. 


Reported By: Fernando Carrera MD 09/18/19 10:47





EXAM#: TYPE/EXAM: RESULT: [id number] US/DUPLEX VASCUL US-2LEGS BILATERAL LOWER 

EXTREMITY VENOUS DUPLEX ULTRASOUND. HISTORY: 78-year-old male with lower 

extremity swelling 


IMPRESSION: No sonographic evidence of right or left lower extremity DVT. 


Reported By: Dashawn Dudley MD 09/18/19 12:58 





Medical Decision Making





- Medical Decision Making





09/18/19 10:23





EKG - NSR rate of 72 bpm, axis nml, intervals normal - pr:138ms, QRS: 78ms, QTc

: 433ms, no st elevation or depression, t waves upright


09/18/19 12:15


77 yo M presenting from the nursing home for his anemia


Pt has a h/o Multiple Myeloma is currently on Revlimid


Pt has a h/o chronic cough, this is unchanged


He denies chest pain


Is short of breath with exertion








09/18/19 12:30


 Laboratory Tests











  09/18/19 09/18/19 09/18/19





  10:20 10:20 10:20


 


WBC   4.0 


 


Hgb   7.9 L 


 


Hct   24.1 L 


 


Plt Count   112 L D 


 


INR    1.00


 


BUN  14.4  


 


Creatinine  1.2  


 


Creatine Kinase  12 L  


 


Troponin I  < 0.02  











Case reviewed with Dr Crowell


She states pt will need iron studies as she is not sure his anemia is due to MM 

vs. other cause





09/18/19 12:49


consent obtained from Bobby Bella for transfusion


Pt is refusing rectal examination


I have asked if this is because he has hemorrhoids or rectal/anal pain


Pt states he just does not want me to do a rectal examination


Will wait for pt to have a bowel movmement





Case reviewed with pt pmd


Will admit to his service





Clinical impression:


Symptomatic anemia, initial presentation





*DC/Admit/Observation/Transfer


Diagnosis at time of Disposition: 


Anemia


Qualifiers:


 Anemia type: other cause Other causes of anemia: other cause, not classified 

Qualified Code(s): D64.89 - Other specified anemias








- Discharge Dispostion


Condition at time of disposition: Stable


Decision to Admit order: Yes





- Referrals


Referrals: 


Jovan Rg MD [Primary Care Provider] - 





- Patient Instructions





- Post Discharge Activity

## 2019-09-18 NOTE — EKG
Test Reason : 

Blood Pressure : ***/*** mmHG

Vent. Rate : 072 BPM     Atrial Rate : 072 BPM

   P-R Int : 138 ms          QRS Dur : 078 ms

    QT Int : 396 ms       P-R-T Axes : 052 -23 050 degrees

   QTc Int : 433 ms

 

SINUS RHYTHM WITH PREMATURE SUPRAVENTRICULAR COMPLEXES

LOW VOLTAGE QRS

BORDERLINE ECG

WHEN COMPARED WITH ECG OF 04-SEP-2019 14:38,

PREMATURE SUPRAVENTRICULAR COMPLEXES ARE NOW PRESENT

Confirmed by MAGDIEL FIGUEROA, DEBORAH ([id number]) on 9/18/2019 3:07:46 PM

 

Referred By:             Confirmed By:DEBORAH VELOZ MD

## 2019-09-19 VITALS — HEART RATE: 92 BPM | DIASTOLIC BLOOD PRESSURE: 64 MMHG | SYSTOLIC BLOOD PRESSURE: 134 MMHG | TEMPERATURE: 98.6 F

## 2019-09-19 LAB
ALBUMIN SERPL-MCNC: 2.6 G/DL (ref 3.4–5)
ALP SERPL-CCNC: 132 U/L (ref 45–117)
ALT SERPL-CCNC: < 6 U/L (ref 13–61)
ANION GAP SERPL CALC-SCNC: 5 MMOL/L (ref 8–16)
AST SERPL-CCNC: 9 U/L (ref 15–37)
BASOPHILS # BLD: 2 % (ref 0–2)
BILIRUB SERPL-MCNC: 0.5 MG/DL (ref 0.2–1)
BUN SERPL-MCNC: 16.5 MG/DL (ref 7–18)
CALCIUM SERPL-MCNC: 8.6 MG/DL (ref 8.5–10.1)
CHLORIDE SERPL-SCNC: 111 MMOL/L (ref 98–107)
CO2 SERPL-SCNC: 26 MMOL/L (ref 21–32)
CREAT SERPL-MCNC: 1.1 MG/DL (ref 0.55–1.3)
DEPRECATED RDW RBC AUTO: 20.2 % (ref 11.9–15.9)
EOSINOPHIL # BLD: 1.2 % (ref 0–4.5)
GLUCOSE SERPL-MCNC: 85 MG/DL (ref 74–106)
HCT VFR BLD CALC: 27 % (ref 35.4–49)
HGB BLD-MCNC: 9 GM/DL (ref 11.7–16.9)
LYMPHOCYTES # BLD: 11.8 % (ref 8–40)
MCH RBC QN AUTO: 32.8 PG (ref 25.7–33.7)
MCHC RBC AUTO-ENTMCNC: 33.2 G/DL (ref 32–35.9)
MCV RBC: 98.9 FL (ref 80–96)
MONOCYTES # BLD AUTO: 13.7 % (ref 3.8–10.2)
NEUTROPHILS # BLD: 71.3 % (ref 42.8–82.8)
PLATELET # BLD AUTO: 105 K/MM3 (ref 134–434)
PMV BLD: 8.6 FL (ref 7.5–11.1)
POTASSIUM SERPLBLD-SCNC: 4.7 MMOL/L (ref 3.5–5.1)
PROT SERPL-MCNC: 5.6 G/DL (ref 6.4–8.2)
RBC # BLD AUTO: 2.73 M/MM3 (ref 4–5.6)
SODIUM SERPL-SCNC: 142 MMOL/L (ref 136–145)
WBC # BLD AUTO: 3.9 K/MM3 (ref 4–10)

## 2019-09-19 NOTE — DS
Physical Examination


Vital Signs: 


 Vital Signs











Temperature  98.1 F   09/19/19 01:28


 


Pulse Rate  72   09/19/19 01:28


 


Respiratory Rate  20   09/19/19 01:28


 


Blood Pressure  130/62   09/19/19 01:28


 


O2 Sat by Pulse Oximetry (%)  95   09/19/19 01:28











Constitutional: Yes: No Distress


Eyes: Yes: Conjunctiva Clear, EOM Intact


HENT: Yes: Atraumatic, Normocephalic


Neck: Yes: Supple, Trachea Midline


Cardiovascular: Yes: Regular Rate and Rhythm


Respiratory: Yes: Rhonchi, SOB, SOB on Exertion


Gastrointestinal: Yes: Normal Bowel Sounds, Soft


...Rectal Exam: Yes: Deferred


Renal/: No: Anuria


Breast(s): Yes: WNL


Musculoskeletal: No: Back Pain


Extremities: No: Amputation, Calf Tenderness


Edema: No


Integumentary: Yes: WNL


...Motor Strength: WNL


Psychiatric: Yes: WNL


Labs: 





 Laboratory Results - last 24 hr











  09/18/19 09/18/19 09/18/19





  10:20 10:20 10:20


 


WBC   4.0 


 


RBC   2.39 L 


 


Hgb   7.9 L 


 


Hct   24.1 L 


 


MCV   100.7 H 


 


MCH   33.2 


 


MCHC   32.9 


 


RDW   20.2 H 


 


Plt Count   112 L D 


 


MPV   8.3  D 


 


Absolute Neuts (auto)   3.0 


 


Neutrophils %   74.6 


 


Lymphocytes %   12.4  D 


 


Monocytes %   10.4 H 


 


Eosinophils %   1.3  D 


 


Basophils %   1.3 


 


Nucleated RBC %   0 


 


PT with INR    11.80


 


INR    1.00


 


Sodium  142  


 


Potassium  4.9  


 


Chloride  109 H  


 


Carbon Dioxide  32  


 


Anion Gap  0 L  


 


BUN  14.4  


 


Creatinine  1.2  


 


Est GFR (CKD-EPI)AfAm  66.73  


 


Est GFR (CKD-EPI)NonAf  57.57  


 


Random Glucose  84  


 


Calcium  8.5  


 


Iron   


 


TIBC   


 


Iron Saturation   


 


Unsaturated IBC   


 


Ferritin  276.2  


 


Total Bilirubin  0.3  


 


AST  6 L  


 


ALT  7 L  


 


Alkaline Phosphatase  136 H  


 


LD Total  126  


 


Creatine Kinase  12 L  


 


Troponin I  < 0.02  


 


Total Protein  5.8 L  


 


Albumin  2.5 L  


 


Serum Folate  20 H  


 


Blood Type   


 


Antibody Screen   


 


Crossmatch   














  09/18/19 09/18/19





  10:20 12:06


 


WBC  


 


RBC  


 


Hgb  


 


Hct  


 


MCV  


 


MCH  


 


MCHC  


 


RDW  


 


Plt Count  


 


MPV  


 


Absolute Neuts (auto)  


 


Neutrophils %  


 


Lymphocytes %  


 


Monocytes %  


 


Eosinophils %  


 


Basophils %  


 


Nucleated RBC %  


 


PT with INR  


 


INR  


 


Sodium  


 


Potassium  


 


Chloride  


 


Carbon Dioxide  


 


Anion Gap  


 


BUN  


 


Creatinine  


 


Est GFR (CKD-EPI)AfAm  


 


Est GFR (CKD-EPI)NonAf  


 


Random Glucose  


 


Calcium  


 


Iron   49 L


 


TIBC   277


 


Iron Saturation   17 L


 


Unsaturated IBC   228


 


Ferritin  


 


Total Bilirubin  


 


AST  


 


ALT  


 


Alkaline Phosphatase  


 


LD Total  


 


Creatine Kinase  


 


Troponin I  


 


Total Protein  


 


Albumin  


 


Serum Folate  


 


Blood Type  AB NEGATIVE 


 


Antibody Screen  Negative 


 


Crossmatch  See Detail 








 Current Active Problems











Problem Status Onset


 


Anemia Acute 


 


HTN (hypertension) Acute 








D/C to [facility] after blood TX


F/u at [facility]





Discharge Summary


Reason For Visit: ANEMIA


Current Active Problems





Anemia (Acute)


HTN (hypertension) (Acute)








Condition: Stable





- Instructions





- Home Medications


Comprehensive Discharge Medication List: 


Ambulatory Orders





Docusate Sodium [Colace -] 100 mg PO BID PRN #28 capsule 12/12/16 


Acetaminophen 650 mg PO QID PRN 02/11/19 


Carbidopa/Levodopa *Cr* 50/200 [Sinemet *Cr* 50/200 -] 1 combo PO TID 02/11/19 


Cyanocobalamin Vit B-12 Inj. [Vitamin B12 Injection -] 1,000 mcg IM MONTHLY 02/ 11/19 


Multivitamin,Ther and Minerals [Vitamin and Minerals] 1 each PO DAILY 02/11/19 


Ipratropium/Albuterol Sulfate [Iprat-Albut 0.5-3(2.5) mg/3 ml] 3 ml IH PRN 09/18 /19

## 2019-09-19 NOTE — PN
Progress Note, Physician


Chief Complaint: 


Received  1 units PRBC . To receive 1 unit today AM


Today comfortable in bed





History of Present Illness: 





MM ON REVLIMID BUT RECENTLY INCREASING SPEP/IF/LC.


Leukopenia, anemia


RECENT PET/CT SHOWED NEW C7 HYPERMETABOLIC LESION, F?U MRI C-spine DDD.


COPD WITH RECENT EXACERBATION


STILL SMOKES


PARKINSON'S


DEMENTIA


HTN, EKG WITH FREQUENT APC/VPC


CKD 2-3


REPEATED FALLS 








- Current Medication List


Current Medications: 


Active Medications





Albuterol/Ipratropium (Duoneb -)  1 amp NEB RQID [facility]


   Last Admin: 09/18/19 21:15 Dose:  1 amp


Amlodipine Besylate (Norvasc -)  5 mg PO DAILY [facility]


Carbidopa/Levodopa (Sinemet *Cr* 50/200 -)  1 combo PO TID [facility]


   Last Admin: 09/19/19 06:21 Dose:  1 combo


Docusate Sodium (Colace -)  100 mg PO BID PRN


   PRN Reason: CONSTIPATION











- Objective


Vital Signs: 


 Vital Signs











Temperature  98.1 F   09/19/19 01:28


 


Pulse Rate  72   09/19/19 01:28


 


Respiratory Rate  20   09/19/19 01:28


 


Blood Pressure  130/62   09/19/19 01:28


 


O2 Sat by Pulse Oximetry (%)  95   09/19/19 01:28











Constitutional: Yes: No Distress, Calm


Eyes: Yes: Conjunctiva Clear, EOM Intact


HENT: Yes: Atraumatic, Normocephalic


Neck: Yes: Supple, Trachea Midline


Cardiovascular: Yes: Regular Rate and Rhythm


Respiratory: Yes: Regular, Rhonchi, SOB, SOB on Exertion, Wheezes


Gastrointestinal: Yes: Normal Bowel Sounds, Soft


Breast(s): Yes: WNL


Musculoskeletal: Yes: WNL


Extremities: No: Calf Tenderness, Cold, Deformity


Integumentary: Yes: WNL


Neurological: Yes: WNL


...Motor Strength: WNL


Psychiatric: Yes: WNL


Labs: 


 INR, PTT











INR  1.00  (0.83-1.09)   09/18/19  10:20    














Problem List





- Problems


(1) Anemia


Assessment/Plan: 


After 2 units PRBC if stable can go to SNF





Code(s): D64.9 - ANEMIA, UNSPECIFIED   


Qualifiers: 


   Anemia type: other cause   Other causes of anemia: other cause, not 

classified   Qualified Code(s): D64.89 - Other specified anemias   





(2) COPD (chronic obstructive pulmonary disease)


Assessment/Plan: 


Nebs PRN Duonebs


Code(s): J44.9 - CHRONIC OBSTRUCTIVE PULMONARY DISEASE, UNSPECIFIED   


Qualifiers: 


   COPD type: emphysema 





(3) Multiple myeloma not having achieved remission


Assessment/Plan: 


Dr Crowell f/u


Code(s): C90.00 - MULTIPLE MYELOMA NOT HAVING ACHIEVED REMISSION   





(4) Acute renal insufficiency


Assessment/Plan: 


Significantly imroved creat due to d/c diuretics, ARB.


Code(s): N28.9 - DISORDER OF KIDNEY AND URETER, UNSPECIFIED   





(5) HTN (hypertension)


Assessment/Plan: 


Start Amlodipine 5 mg QD and F/U BP


Code(s): I10 - ESSENTIAL (PRIMARY) HYPERTENSION   


Qualifiers: 


   Hypertension type: essential hypertension   Qualified Code(s): I10 - 

Essential (primary) hypertension

## 2021-03-26 ENCOUNTER — HOSPITAL ENCOUNTER (EMERGENCY)
Dept: HOSPITAL 74 - JER | Age: 81
Discharge: HOME | End: 2021-03-26
Payer: COMMERCIAL

## 2021-03-26 VITALS — BODY MASS INDEX: 28.1 KG/M2

## 2021-03-26 VITALS — SYSTOLIC BLOOD PRESSURE: 127 MMHG | DIASTOLIC BLOOD PRESSURE: 82 MMHG

## 2021-03-26 VITALS — HEART RATE: 88 BPM | TEMPERATURE: 98.4 F

## 2021-03-26 DIAGNOSIS — J44.9: Primary | ICD-10-CM

## 2021-03-26 LAB
ALBUMIN SERPL-MCNC: 3 G/DL (ref 3.4–5)
ALP SERPL-CCNC: 117 U/L (ref 45–117)
ALT SERPL-CCNC: 8 U/L (ref 13–61)
ANION GAP SERPL CALC-SCNC: 10 MMOL/L (ref 8–16)
APTT BLD: 27.7 SECONDS (ref 25.2–36.5)
AST SERPL-CCNC: 22 U/L (ref 15–37)
BASOPHILS # BLD: 0.1 % (ref 0–2)
BILIRUB SERPL-MCNC: 0.7 MG/DL (ref 0.2–1)
BNP SERPL-MCNC: 6619.8 PG/ML (ref 5–450)
BUN SERPL-MCNC: 33.1 MG/DL (ref 7–18)
CALCIUM SERPL-MCNC: 9.2 MG/DL (ref 8.5–10.1)
CHLORIDE SERPL-SCNC: 104 MMOL/L (ref 98–107)
CO2 SERPL-SCNC: 29 MMOL/L (ref 21–32)
CREAT SERPL-MCNC: 2.2 MG/DL (ref 0.55–1.3)
DEPRECATED RDW RBC AUTO: 15.2 % (ref 11.9–15.9)
EOSINOPHIL # BLD: 1 % (ref 0–4.5)
GLUCOSE SERPL-MCNC: 68 MG/DL (ref 74–106)
HCT VFR BLD CALC: 40.1 % (ref 35.4–49)
HGB BLD-MCNC: 13.1 GM/DL (ref 11.7–16.9)
INR BLD: 1 (ref 0.83–1.09)
LYMPHOCYTES # BLD: 3.4 % (ref 8–40)
MAGNESIUM SERPL-MCNC: 1.9 MG/DL (ref 1.8–2.4)
MCH RBC QN AUTO: 31.8 PG (ref 25.7–33.7)
MCHC RBC AUTO-ENTMCNC: 32.7 G/DL (ref 32–35.9)
MCV RBC: 97.1 FL (ref 80–96)
MONOCYTES # BLD AUTO: 4.6 % (ref 3.8–10.2)
NEUTROPHILS # BLD: 90.9 % (ref 42.8–82.8)
PHOSPHATE SERPL-MCNC: 2.4 MG/DL (ref 2.5–4.9)
PLATELET # BLD AUTO: 101 K/MM3 (ref 134–434)
PMV BLD: 10.3 FL (ref 7.5–11.1)
POTASSIUM SERPLBLD-SCNC: 3.3 MMOL/L (ref 3.5–5.1)
PROT SERPL-MCNC: 6.6 G/DL (ref 6.4–8.2)
PT PNL PPP: 12.1 SEC (ref 9.7–13)
RBC # BLD AUTO: 4.13 M/MM3 (ref 4–5.6)
SODIUM SERPL-SCNC: 144 MMOL/L (ref 136–145)
WBC # BLD AUTO: 15 K/MM3 (ref 4–10)

## 2021-03-31 ENCOUNTER — HOSPITAL ENCOUNTER (INPATIENT)
Dept: HOSPITAL 74 - JER | Age: 81
LOS: 5 days | Discharge: NURSING, REHAB OR LONG-TERM CARE | DRG: 175 | End: 2021-04-05
Attending: GENERAL ACUTE CARE HOSPITAL | Admitting: INTERNAL MEDICINE
Payer: COMMERCIAL

## 2021-03-31 VITALS — BODY MASS INDEX: 28.1 KG/M2

## 2021-03-31 DIAGNOSIS — I82.4Z2: ICD-10-CM

## 2021-03-31 DIAGNOSIS — N39.0: ICD-10-CM

## 2021-03-31 DIAGNOSIS — I50.9: ICD-10-CM

## 2021-03-31 DIAGNOSIS — J44.9: ICD-10-CM

## 2021-03-31 DIAGNOSIS — C90.00: ICD-10-CM

## 2021-03-31 DIAGNOSIS — E87.5: ICD-10-CM

## 2021-03-31 DIAGNOSIS — J96.01: ICD-10-CM

## 2021-03-31 DIAGNOSIS — I13.0: ICD-10-CM

## 2021-03-31 DIAGNOSIS — G20: ICD-10-CM

## 2021-03-31 DIAGNOSIS — N18.30: ICD-10-CM

## 2021-03-31 DIAGNOSIS — R74.01: ICD-10-CM

## 2021-03-31 DIAGNOSIS — D69.6: ICD-10-CM

## 2021-03-31 DIAGNOSIS — N17.9: ICD-10-CM

## 2021-03-31 DIAGNOSIS — I26.99: Primary | ICD-10-CM

## 2021-03-31 LAB
ALBUMIN SERPL-MCNC: 2.8 G/DL (ref 3.4–5)
ALP SERPL-CCNC: 104 U/L (ref 45–117)
ALT SERPL-CCNC: 11 U/L (ref 13–61)
ANION GAP SERPL CALC-SCNC: 7 MMOL/L (ref 8–16)
APTT BLD: 26.9 SECONDS (ref 25.2–36.5)
AST SERPL-CCNC: 43 U/L (ref 15–37)
BASOPHILS # BLD: 0.7 % (ref 0–2)
BILIRUB DIRECT SERPL-MCNC: 462 U/L (ref 87–246)
BILIRUB SERPL-MCNC: 1.3 MG/DL (ref 0.2–1)
BNP SERPL-MCNC: 2731.1 PG/ML (ref 5–450)
BUN SERPL-MCNC: 22.6 MG/DL (ref 7–18)
CALCIUM SERPL-MCNC: 8.4 MG/DL (ref 8.5–10.1)
CHLORIDE SERPL-SCNC: 105 MMOL/L (ref 98–107)
CO2 SERPL-SCNC: 27 MMOL/L (ref 21–32)
CREAT SERPL-MCNC: 2 MG/DL (ref 0.55–1.3)
DEPRECATED RDW RBC AUTO: 15.6 % (ref 11.9–15.9)
EOSINOPHIL # BLD: 1.7 % (ref 0–4.5)
GLUCOSE SERPL-MCNC: 91 MG/DL (ref 74–106)
HCT VFR BLD CALC: 39.1 % (ref 35.4–49)
HGB BLD-MCNC: 12.6 GM/DL (ref 11.7–16.9)
INR BLD: 1.02 (ref 0.83–1.09)
LYMPHOCYTES # BLD: 5.5 % (ref 8–40)
MCH RBC QN AUTO: 31.2 PG (ref 25.7–33.7)
MCHC RBC AUTO-ENTMCNC: 32.2 G/DL (ref 32–35.9)
MCV RBC: 96.8 FL (ref 80–96)
MONOCYTES # BLD AUTO: 12.6 % (ref 3.8–10.2)
NEUTROPHILS # BLD: 79.5 % (ref 42.8–82.8)
PLATELET # BLD AUTO: 124 K/MM3 (ref 134–434)
PMV BLD: 11.8 FL (ref 7.5–11.1)
POTASSIUM SERPLBLD-SCNC: 5.2 MMOL/L (ref 3.5–5.1)
PROT SERPL-MCNC: 6.6 G/DL (ref 6.4–8.2)
PT PNL PPP: 12.5 SEC (ref 9.7–13)
RBC # BLD AUTO: 4.04 M/MM3 (ref 4–5.6)
SODIUM SERPL-SCNC: 138 MMOL/L (ref 136–145)
WBC # BLD AUTO: 8.5 K/MM3 (ref 4–10)

## 2021-03-31 PROCEDURE — U0005 INFEC AGEN DETEC AMPLI PROBE: HCPCS

## 2021-03-31 PROCEDURE — U0003 INFECTIOUS AGENT DETECTION BY NUCLEIC ACID (DNA OR RNA); SEVERE ACUTE RESPIRATORY SYNDROME CORONAVIRUS 2 (SARS-COV-2) (CORONAVIRUS DISEASE [COVID-19]), AMPLIFIED PROBE TECHNIQUE, MAKING USE OF HIGH THROUGHPUT TECHNOLOGIES AS DESCRIBED BY CMS-2020-01-R: HCPCS

## 2021-03-31 PROCEDURE — C9803 HOPD COVID-19 SPEC COLLECT: HCPCS

## 2021-03-31 PROCEDURE — G0378 HOSPITAL OBSERVATION PER HR: HCPCS

## 2021-03-31 RX ADMIN — SODIUM CHLORIDE SCH MLS/HR: 9 INJECTION, SOLUTION INTRAVENOUS at 18:39

## 2021-04-01 LAB
ALBUMIN SERPL-MCNC: 2.6 G/DL (ref 3.4–5)
ALP SERPL-CCNC: 98 U/L (ref 45–117)
ALT SERPL-CCNC: 11 U/L (ref 13–61)
ANION GAP SERPL CALC-SCNC: 6 MMOL/L (ref 8–16)
AST SERPL-CCNC: 25 U/L (ref 15–37)
BASOPHILS # BLD: 0.3 % (ref 0–2)
BILIRUB SERPL-MCNC: 1.1 MG/DL (ref 0.2–1)
BUN SERPL-MCNC: 23.5 MG/DL (ref 7–18)
CALCIUM SERPL-MCNC: 8.1 MG/DL (ref 8.5–10.1)
CHLORIDE SERPL-SCNC: 108 MMOL/L (ref 98–107)
CO2 SERPL-SCNC: 26 MMOL/L (ref 21–32)
CREAT SERPL-MCNC: 1.8 MG/DL (ref 0.55–1.3)
DEPRECATED RDW RBC AUTO: 15.7 % (ref 11.9–15.9)
EOSINOPHIL # BLD: 2.6 % (ref 0–4.5)
GLUCOSE SERPL-MCNC: 86 MG/DL (ref 74–106)
HCT VFR BLD CALC: 36.9 % (ref 35.4–49)
HGB BLD-MCNC: 12 GM/DL (ref 11.7–16.9)
LYMPHOCYTES # BLD: 7.7 % (ref 8–40)
MAGNESIUM SERPL-MCNC: 1.7 MG/DL (ref 1.8–2.4)
MCH RBC QN AUTO: 31.6 PG (ref 25.7–33.7)
MCHC RBC AUTO-ENTMCNC: 32.3 G/DL (ref 32–35.9)
MCV RBC: 97.6 FL (ref 80–96)
MONOCYTES # BLD AUTO: 12.8 % (ref 3.8–10.2)
NEUTROPHILS # BLD: 76.6 % (ref 42.8–82.8)
PHOSPHATE SERPL-MCNC: 3.5 MG/DL (ref 2.5–4.9)
PLATELET # BLD AUTO: 83 K/MM3 (ref 134–434)
PMV BLD: 11.6 FL (ref 7.5–11.1)
POTASSIUM SERPLBLD-SCNC: 4 MMOL/L (ref 3.5–5.1)
PROT SERPL-MCNC: 5.7 G/DL (ref 6.4–8.2)
RBC # BLD AUTO: 3.79 M/MM3 (ref 4–5.6)
SODIUM SERPL-SCNC: 141 MMOL/L (ref 136–145)
WBC # BLD AUTO: 5.9 K/MM3 (ref 4–10)

## 2021-04-02 LAB
ALBUMIN SERPL-MCNC: 2.4 G/DL (ref 3.4–5)
ALP SERPL-CCNC: 94 U/L (ref 45–117)
ALT SERPL-CCNC: 12 U/L (ref 13–61)
ANION GAP SERPL CALC-SCNC: 6 MMOL/L (ref 8–16)
AST SERPL-CCNC: 17 U/L (ref 15–37)
BILIRUB SERPL-MCNC: 0.8 MG/DL (ref 0.2–1)
BUN SERPL-MCNC: 19.6 MG/DL (ref 7–18)
CALCIUM SERPL-MCNC: 8.3 MG/DL (ref 8.5–10.1)
CHLORIDE SERPL-SCNC: 107 MMOL/L (ref 98–107)
CO2 SERPL-SCNC: 27 MMOL/L (ref 21–32)
CREAT SERPL-MCNC: 1.6 MG/DL (ref 0.55–1.3)
DEPRECATED RDW RBC AUTO: 15.4 % (ref 11.9–15.9)
GLUCOSE SERPL-MCNC: 93 MG/DL (ref 74–106)
HCT VFR BLD CALC: 35.3 % (ref 35.4–49)
HGB BLD-MCNC: 11.9 GM/DL (ref 11.7–16.9)
IGA SER-ACNC: 374 MG/DL (ref 61–437)
IGG SERPL-MCNC: 523 MG/DL (ref 603–1613)
IGM SERPL-MCNC: 35 MG/DL (ref 15–143)
MAGNESIUM SERPL-MCNC: 2.1 MG/DL (ref 1.8–2.4)
MCH RBC QN AUTO: 32.1 PG (ref 25.7–33.7)
MCHC RBC AUTO-ENTMCNC: 33.6 G/DL (ref 32–35.9)
MCV RBC: 95.6 FL (ref 80–96)
PLATELET # BLD AUTO: 96 K/MM3 (ref 134–434)
PMV BLD: 10.9 FL (ref 7.5–11.1)
POTASSIUM SERPLBLD-SCNC: 3.9 MMOL/L (ref 3.5–5.1)
PROT SERPL-MCNC: 5.6 G/DL (ref 6.4–8.2)
RBC # BLD AUTO: 3.7 M/MM3 (ref 4–5.6)
SODIUM SERPL-SCNC: 140 MMOL/L (ref 136–145)
WBC # BLD AUTO: 5.2 K/MM3 (ref 4–10)

## 2021-04-02 RX ADMIN — APIXABAN SCH MG: 5 TABLET, FILM COATED ORAL at 21:16

## 2021-04-02 RX ADMIN — SODIUM CHLORIDE SCH MLS/HR: 9 INJECTION, SOLUTION INTRAVENOUS at 07:00

## 2021-04-02 RX ADMIN — APIXABAN SCH MG: 5 TABLET, FILM COATED ORAL at 13:34

## 2021-04-03 LAB
ANION GAP SERPL CALC-SCNC: 6 MMOL/L (ref 8–16)
APPEARANCE UR: (no result)
BACTERIA # UR AUTO: (no result) /UL (ref 0–1359)
BASOPHILS # BLD: 0.2 % (ref 0–2)
BILIRUB UR STRIP.AUTO-MCNC: NEGATIVE MG/DL
BUN SERPL-MCNC: 15.8 MG/DL (ref 7–18)
CALCIUM SERPL-MCNC: 8.3 MG/DL (ref 8.5–10.1)
CASTS URNS QL MICRO: 2 /UL (ref 0–3.1)
CHLORIDE SERPL-SCNC: 108 MMOL/L (ref 98–107)
CO2 SERPL-SCNC: 27 MMOL/L (ref 21–32)
COLOR UR: YELLOW
CREAT SERPL-MCNC: 1.5 MG/DL (ref 0.55–1.3)
DEPRECATED RDW RBC AUTO: 14.8 % (ref 11.9–15.9)
EOSINOPHIL # BLD: 4.6 % (ref 0–4.5)
EPITH CASTS URNS QL MICRO: 11 /UL (ref 0–25.1)
GLUCOSE SERPL-MCNC: 90 MG/DL (ref 74–106)
HCT VFR BLD CALC: 33 % (ref 35.4–49)
HGB BLD-MCNC: 11 GM/DL (ref 11.7–16.9)
KETONES UR QL STRIP: NEGATIVE
LEUKOCYTE ESTERASE UR QL STRIP.AUTO: (no result)
LYMPHOCYTES # BLD: 9.2 % (ref 8–40)
MCH RBC QN AUTO: 32.2 PG (ref 25.7–33.7)
MCHC RBC AUTO-ENTMCNC: 33.2 G/DL (ref 32–35.9)
MCV RBC: 97 FL (ref 80–96)
MONOCYTES # BLD AUTO: 15.7 % (ref 3.8–10.2)
NEUTROPHILS # BLD: 70.3 % (ref 42.8–82.8)
NITRITE UR QL STRIP: NEGATIVE
PH UR: 5.5 [PH] (ref 5–8)
PLATELET # BLD AUTO: 103 K/MM3 (ref 134–434)
PMV BLD: 10.8 FL (ref 7.5–11.1)
POTASSIUM SERPLBLD-SCNC: 4.1 MMOL/L (ref 3.5–5.1)
PROT UR QL STRIP: (no result)
PROT UR QL STRIP: NEGATIVE
RBC # BLD AUTO: 3.41 M/MM3 (ref 4–5.6)
RBC # BLD AUTO: 30 /UL (ref 0–23.9)
SODIUM SERPL-SCNC: 140 MMOL/L (ref 136–145)
SP GR UR: 1.04 (ref 1.01–1.03)
UROBILINOGEN UR STRIP-MCNC: 1 MG/DL (ref 0.2–1)
WBC # BLD AUTO: 4 K/MM3 (ref 4–10)
WBC # UR AUTO: 2660 /UL (ref 0–25.8)

## 2021-04-03 RX ADMIN — APIXABAN SCH MG: 5 TABLET, FILM COATED ORAL at 09:47

## 2021-04-03 RX ADMIN — APIXABAN SCH MG: 5 TABLET, FILM COATED ORAL at 21:21

## 2021-04-03 RX ADMIN — Medication SCH: at 15:35

## 2021-04-03 RX ADMIN — Medication SCH: at 15:36

## 2021-04-04 RX ADMIN — LOSARTAN POTASSIUM SCH MG: 25 TABLET, FILM COATED ORAL at 10:41

## 2021-04-04 RX ADMIN — APIXABAN SCH MG: 5 TABLET, FILM COATED ORAL at 21:38

## 2021-04-04 RX ADMIN — CEFTRIAXONE SCH MLS/HR: 1 INJECTION, POWDER, FOR SOLUTION INTRAMUSCULAR; INTRAVENOUS at 12:33

## 2021-04-04 RX ADMIN — APIXABAN SCH MG: 5 TABLET, FILM COATED ORAL at 10:41

## 2021-04-05 VITALS — HEART RATE: 64 BPM | TEMPERATURE: 97.9 F | DIASTOLIC BLOOD PRESSURE: 55 MMHG | SYSTOLIC BLOOD PRESSURE: 145 MMHG

## 2021-04-05 LAB
ALBUMIN SERPL-MCNC: 2.4 G/DL (ref 3.4–5)
ALP SERPL-CCNC: 98 U/L (ref 45–117)
ALT SERPL-CCNC: 7 U/L (ref 13–61)
ANION GAP SERPL CALC-SCNC: 3 MMOL/L (ref 8–16)
AST SERPL-CCNC: 13 U/L (ref 15–37)
BASOPHILS # BLD: 0.4 % (ref 0–2)
BILIRUB SERPL-MCNC: 0.5 MG/DL (ref 0.2–1)
BUN SERPL-MCNC: 12.5 MG/DL (ref 7–18)
CALCIUM SERPL-MCNC: 8.3 MG/DL (ref 8.5–10.1)
CHLORIDE SERPL-SCNC: 108 MMOL/L (ref 98–107)
CO2 SERPL-SCNC: 29 MMOL/L (ref 21–32)
CREAT SERPL-MCNC: 1.4 MG/DL (ref 0.55–1.3)
DEPRECATED RDW RBC AUTO: 14.7 % (ref 11.9–15.9)
EOSINOPHIL # BLD: 4.1 % (ref 0–4.5)
GLUCOSE SERPL-MCNC: 95 MG/DL (ref 74–106)
HCT VFR BLD CALC: 35.4 % (ref 35.4–49)
HGB BLD-MCNC: 11.9 GM/DL (ref 11.7–16.9)
LYMPHOCYTES # BLD: 9.7 % (ref 8–40)
MCH RBC QN AUTO: 32.3 PG (ref 25.7–33.7)
MCHC RBC AUTO-ENTMCNC: 33.5 G/DL (ref 32–35.9)
MCV RBC: 96.4 FL (ref 80–96)
MONOCYTES # BLD AUTO: 14.1 % (ref 3.8–10.2)
NEUTROPHILS # BLD: 71.7 % (ref 42.8–82.8)
PLATELET # BLD AUTO: 139 K/MM3 (ref 134–434)
PMV BLD: 10.4 FL (ref 7.5–11.1)
POTASSIUM SERPLBLD-SCNC: 4.8 MMOL/L (ref 3.5–5.1)
PROT SERPL-MCNC: 5.7 G/DL (ref 6.4–8.2)
RBC # BLD AUTO: 3.67 M/MM3 (ref 4–5.6)
SODIUM SERPL-SCNC: 140 MMOL/L (ref 136–145)
WBC # BLD AUTO: 4.3 K/MM3 (ref 4–10)

## 2021-04-05 RX ADMIN — APIXABAN SCH MG: 5 TABLET, FILM COATED ORAL at 09:14

## 2021-04-05 RX ADMIN — LOSARTAN POTASSIUM SCH MG: 25 TABLET, FILM COATED ORAL at 09:14

## 2021-04-05 RX ADMIN — CEFTRIAXONE SCH MLS/HR: 1 INJECTION, POWDER, FOR SOLUTION INTRAMUSCULAR; INTRAVENOUS at 09:14

## 2021-06-02 ENCOUNTER — HOSPITAL ENCOUNTER (OUTPATIENT)
Dept: HOSPITAL 74 - JONCCHEMO | Age: 81
Discharge: HOME | End: 2021-06-02
Attending: INTERNAL MEDICINE
Payer: COMMERCIAL

## 2021-06-02 VITALS — HEART RATE: 101 BPM | DIASTOLIC BLOOD PRESSURE: 67 MMHG | SYSTOLIC BLOOD PRESSURE: 152 MMHG | TEMPERATURE: 98.5 F

## 2021-06-02 DIAGNOSIS — C90.00: ICD-10-CM

## 2021-06-02 DIAGNOSIS — Z51.11: Primary | ICD-10-CM

## 2021-06-02 LAB
ALBUMIN SERPL-MCNC: 3.1 G/DL (ref 3.4–5)
ALP SERPL-CCNC: 123 U/L (ref 45–117)
ALT SERPL-CCNC: 8 U/L (ref 13–61)
ANION GAP SERPL CALC-SCNC: 6 MMOL/L (ref 8–16)
ANISOCYTOSIS BLD QL: (no result)
AST SERPL-CCNC: 13 U/L (ref 15–37)
BASOPHILS # BLD: 1.4 % (ref 0–2)
BILIRUB SERPL-MCNC: 0.6 MG/DL (ref 0.2–1)
BUN SERPL-MCNC: 27.2 MG/DL (ref 7–18)
CALCIUM SERPL-MCNC: 8.5 MG/DL (ref 8.5–10.1)
CHLORIDE SERPL-SCNC: 107 MMOL/L (ref 98–107)
CO2 SERPL-SCNC: 28 MMOL/L (ref 21–32)
CREAT SERPL-MCNC: 1.9 MG/DL (ref 0.55–1.3)
DEPRECATED RDW RBC AUTO: 17 % (ref 11.9–15.9)
EOSINOPHIL # BLD: 5.6 % (ref 0–4.5)
GLUCOSE SERPL-MCNC: 92 MG/DL (ref 74–106)
HCT VFR BLD CALC: 30.9 % (ref 35.4–49)
HGB BLD-MCNC: 10.4 GM/DL (ref 11.7–16.9)
LYMPHOCYTES # BLD: 11.2 % (ref 8–40)
MACROCYTES BLD QL: 0
MCH RBC QN AUTO: 32 PG (ref 25.7–33.7)
MCHC RBC AUTO-ENTMCNC: 33.7 G/DL (ref 32–35.9)
MCV RBC: 94.8 FL (ref 80–96)
MONOCYTES # BLD AUTO: 11.8 % (ref 3.8–10.2)
NEUTROPHILS # BLD: 70 % (ref 42.8–82.8)
PLATELET # BLD AUTO: 96 K/MM3 (ref 134–434)
PLATELET BLD QL SMEAR: (no result)
PMV BLD: 9.4 FL (ref 7.5–11.1)
PROT SERPL-MCNC: 5.9 G/DL (ref 6.4–8.2)
RBC # BLD AUTO: 3.26 M/MM3 (ref 4–5.6)
SODIUM SERPL-SCNC: 141 MMOL/L (ref 136–145)
WBC # BLD AUTO: 4.4 K/MM3 (ref 4–10)

## 2021-06-02 PROCEDURE — 3E01305 INTRODUCTION OF OTHER ANTINEOPLASTIC INTO SUBCUTANEOUS TISSUE, PERCUTANEOUS APPROACH: ICD-10-PCS | Performed by: INTERNAL MEDICINE

## 2021-06-02 PROCEDURE — 3E033GC INTRODUCTION OF OTHER THERAPEUTIC SUBSTANCE INTO PERIPHERAL VEIN, PERCUTANEOUS APPROACH: ICD-10-PCS | Performed by: INTERNAL MEDICINE

## 2021-06-09 ENCOUNTER — HOSPITAL ENCOUNTER (OUTPATIENT)
Dept: HOSPITAL 74 - JONCCHEMO | Age: 81
Discharge: HOME | End: 2021-06-09
Attending: INTERNAL MEDICINE
Payer: COMMERCIAL

## 2021-06-09 VITALS — SYSTOLIC BLOOD PRESSURE: 129 MMHG | HEART RATE: 67 BPM | DIASTOLIC BLOOD PRESSURE: 53 MMHG

## 2021-06-09 VITALS — TEMPERATURE: 98.4 F

## 2021-06-09 DIAGNOSIS — C90.00: ICD-10-CM

## 2021-06-09 DIAGNOSIS — Z51.11: Primary | ICD-10-CM

## 2021-06-09 LAB
ALBUMIN SERPL-MCNC: 3 G/DL (ref 3.4–5)
ALP SERPL-CCNC: 129 U/L (ref 45–117)
ALT SERPL-CCNC: 15 U/L (ref 13–61)
ANION GAP SERPL CALC-SCNC: 9 MMOL/L (ref 8–16)
AST SERPL-CCNC: 21 U/L (ref 15–37)
BASOPHILS # BLD: 0.9 % (ref 0–2)
BILIRUB SERPL-MCNC: 0.8 MG/DL (ref 0.2–1)
BUN SERPL-MCNC: 31 MG/DL (ref 7–18)
CALCIUM SERPL-MCNC: 8.4 MG/DL (ref 8.5–10.1)
CHLORIDE SERPL-SCNC: 108 MMOL/L (ref 98–107)
CO2 SERPL-SCNC: 22 MMOL/L (ref 21–32)
CREAT SERPL-MCNC: 1.7 MG/DL (ref 0.55–1.3)
DEPRECATED RDW RBC AUTO: 17.4 % (ref 11.9–15.9)
EOSINOPHIL # BLD: 3.6 % (ref 0–4.5)
GLUCOSE SERPL-MCNC: 115 MG/DL (ref 74–106)
HCT VFR BLD CALC: 30.5 % (ref 35.4–49)
HGB BLD-MCNC: 10.4 GM/DL (ref 11.7–16.9)
LYMPHOCYTES # BLD: 8.9 % (ref 8–40)
MCH RBC QN AUTO: 32.3 PG (ref 25.7–33.7)
MCHC RBC AUTO-ENTMCNC: 34 G/DL (ref 32–35.9)
MCV RBC: 94.8 FL (ref 80–96)
MONOCYTES # BLD AUTO: 14 % (ref 3.8–10.2)
NEUTROPHILS # BLD: 72.6 % (ref 42.8–82.8)
PLATELET # BLD AUTO: 141 K/MM3 (ref 134–434)
PMV BLD: 9.7 FL (ref 7.5–11.1)
PROT SERPL-MCNC: 5.8 G/DL (ref 6.4–8.2)
RBC # BLD AUTO: 3.21 M/MM3 (ref 4–5.6)
SODIUM SERPL-SCNC: 140 MMOL/L (ref 136–145)
WBC # BLD AUTO: 4.1 K/MM3 (ref 4–10)

## 2021-06-09 PROCEDURE — 3E033GC INTRODUCTION OF OTHER THERAPEUTIC SUBSTANCE INTO PERIPHERAL VEIN, PERCUTANEOUS APPROACH: ICD-10-PCS | Performed by: INTERNAL MEDICINE

## 2021-06-09 PROCEDURE — 3E01305 INTRODUCTION OF OTHER ANTINEOPLASTIC INTO SUBCUTANEOUS TISSUE, PERCUTANEOUS APPROACH: ICD-10-PCS | Performed by: INTERNAL MEDICINE

## 2021-06-16 ENCOUNTER — HOSPITAL ENCOUNTER (OUTPATIENT)
Dept: HOSPITAL 74 - JONCCHEMO | Age: 81
Discharge: HOME | End: 2021-06-16
Attending: INTERNAL MEDICINE
Payer: COMMERCIAL

## 2021-06-16 VITALS — SYSTOLIC BLOOD PRESSURE: 141 MMHG | HEART RATE: 74 BPM | DIASTOLIC BLOOD PRESSURE: 56 MMHG

## 2021-06-16 VITALS — TEMPERATURE: 97.8 F

## 2021-06-16 DIAGNOSIS — Z51.11: Primary | ICD-10-CM

## 2021-06-16 DIAGNOSIS — C90.00: ICD-10-CM

## 2021-06-16 LAB
ALBUMIN SERPL-MCNC: 3.2 G/DL (ref 3.4–5)
ALP SERPL-CCNC: 104 U/L (ref 45–117)
ALT SERPL-CCNC: 8 U/L (ref 13–61)
ANION GAP SERPL CALC-SCNC: 9 MMOL/L (ref 8–16)
AST SERPL-CCNC: 7 U/L (ref 15–37)
BASOPHILS # BLD: 1.5 % (ref 0–2)
BILIRUB SERPL-MCNC: 0.4 MG/DL (ref 0.2–1)
BUN SERPL-MCNC: 23.5 MG/DL (ref 7–18)
CALCIUM SERPL-MCNC: 9 MG/DL (ref 8.5–10.1)
CHLORIDE SERPL-SCNC: 107 MMOL/L (ref 98–107)
CO2 SERPL-SCNC: 22 MMOL/L (ref 21–32)
CREAT SERPL-MCNC: 2 MG/DL (ref 0.55–1.3)
DEPRECATED RDW RBC AUTO: 19.3 % (ref 11.9–15.9)
EOSINOPHIL # BLD: 5.7 % (ref 0–4.5)
GLUCOSE SERPL-MCNC: 124 MG/DL (ref 74–106)
HCT VFR BLD CALC: 31.8 % (ref 35.4–49)
HGB BLD-MCNC: 10.7 GM/DL (ref 11.7–16.9)
LYMPHOCYTES # BLD: 17.3 % (ref 8–40)
MCH RBC QN AUTO: 32.3 PG (ref 25.7–33.7)
MCHC RBC AUTO-ENTMCNC: 33.7 G/DL (ref 32–35.9)
MCV RBC: 95.7 FL (ref 80–96)
MONOCYTES # BLD AUTO: 11.4 % (ref 3.8–10.2)
NEUTROPHILS # BLD: 64.1 % (ref 42.8–82.8)
PLATELET # BLD AUTO: 165 10^3/UL (ref 134–434)
PMV BLD: 8.2 FL (ref 7.5–11.1)
PROT SERPL-MCNC: 5.8 G/DL (ref 6.4–8.2)
RBC # BLD AUTO: 3.32 M/MM3 (ref 4–5.6)
SODIUM SERPL-SCNC: 137 MMOL/L (ref 136–145)
WBC # BLD AUTO: 2 K/MM3 (ref 4–10)

## 2021-06-16 PROCEDURE — 3E01305 INTRODUCTION OF OTHER ANTINEOPLASTIC INTO SUBCUTANEOUS TISSUE, PERCUTANEOUS APPROACH: ICD-10-PCS | Performed by: INTERNAL MEDICINE

## 2021-06-16 PROCEDURE — 3E033GC INTRODUCTION OF OTHER THERAPEUTIC SUBSTANCE INTO PERIPHERAL VEIN, PERCUTANEOUS APPROACH: ICD-10-PCS | Performed by: INTERNAL MEDICINE

## 2021-06-23 ENCOUNTER — HOSPITAL ENCOUNTER (OUTPATIENT)
Dept: HOSPITAL 74 - JONCCHEMO | Age: 81
Discharge: HOME | End: 2021-06-23
Attending: INTERNAL MEDICINE
Payer: COMMERCIAL

## 2021-06-23 VITALS — SYSTOLIC BLOOD PRESSURE: 134 MMHG | HEART RATE: 76 BPM | DIASTOLIC BLOOD PRESSURE: 92 MMHG

## 2021-06-23 VITALS — TEMPERATURE: 98.3 F

## 2021-06-23 DIAGNOSIS — Z51.11: Primary | ICD-10-CM

## 2021-06-23 DIAGNOSIS — C90.00: ICD-10-CM

## 2021-06-23 LAB
ALBUMIN SERPL-MCNC: 3.3 G/DL (ref 3.4–5)
ALP SERPL-CCNC: 96 U/L (ref 45–117)
ALT SERPL-CCNC: < 6 U/L (ref 13–61)
ANION GAP SERPL CALC-SCNC: 7 MMOL/L (ref 8–16)
AST SERPL-CCNC: 8 U/L (ref 15–37)
BASOPHILS # BLD: 0.4 % (ref 0–2)
BILIRUB SERPL-MCNC: 0.7 MG/DL (ref 0.2–1)
BUN SERPL-MCNC: 19.3 MG/DL (ref 7–18)
CALCIUM SERPL-MCNC: 8.9 MG/DL (ref 8.5–10.1)
CHLORIDE SERPL-SCNC: 108 MMOL/L (ref 98–107)
CO2 SERPL-SCNC: 24 MMOL/L (ref 21–32)
CREAT SERPL-MCNC: 1.5 MG/DL (ref 0.55–1.3)
DEPRECATED RDW RBC AUTO: 20.4 % (ref 11.9–15.9)
EOSINOPHIL # BLD: 2.4 % (ref 0–4.5)
GLUCOSE SERPL-MCNC: 105 MG/DL (ref 74–106)
HCT VFR BLD CALC: 32.2 % (ref 35.4–49)
HGB BLD-MCNC: 11 GM/DL (ref 11.7–16.9)
LYMPHOCYTES # BLD: 12.6 % (ref 8–40)
MCH RBC QN AUTO: 33.1 PG (ref 25.7–33.7)
MCHC RBC AUTO-ENTMCNC: 34.3 G/DL (ref 32–35.9)
MCV RBC: 96.6 FL (ref 80–96)
MONOCYTES # BLD AUTO: 13.6 % (ref 3.8–10.2)
NEUTROPHILS # BLD: 71 % (ref 42.8–82.8)
PLATELET # BLD AUTO: 120 10^3/UL (ref 134–434)
PMV BLD: 7.8 FL (ref 7.5–11.1)
PROT SERPL-MCNC: 5.6 G/DL (ref 6.4–8.2)
RBC # BLD AUTO: 3.34 M/MM3 (ref 4–5.6)
SODIUM SERPL-SCNC: 139 MMOL/L (ref 136–145)
WBC # BLD AUTO: 3.3 K/MM3 (ref 4–10)

## 2021-06-23 PROCEDURE — 3E033GC INTRODUCTION OF OTHER THERAPEUTIC SUBSTANCE INTO PERIPHERAL VEIN, PERCUTANEOUS APPROACH: ICD-10-PCS | Performed by: INTERNAL MEDICINE

## 2021-06-23 PROCEDURE — 3E01305 INTRODUCTION OF OTHER ANTINEOPLASTIC INTO SUBCUTANEOUS TISSUE, PERCUTANEOUS APPROACH: ICD-10-PCS | Performed by: INTERNAL MEDICINE

## 2021-06-24 LAB — KAPPA LC FREE SER-MCNC: 21.7 MG/L (ref 3.3–19.4)

## 2021-06-30 ENCOUNTER — HOSPITAL ENCOUNTER (OUTPATIENT)
Dept: HOSPITAL 74 - JONCCHEMO | Age: 81
Discharge: HOME | End: 2021-06-30
Attending: INTERNAL MEDICINE
Payer: COMMERCIAL

## 2021-06-30 VITALS — HEART RATE: 72 BPM | SYSTOLIC BLOOD PRESSURE: 146 MMHG | DIASTOLIC BLOOD PRESSURE: 67 MMHG

## 2021-06-30 VITALS — TEMPERATURE: 98.2 F

## 2021-06-30 DIAGNOSIS — Z51.11: Primary | ICD-10-CM

## 2021-06-30 DIAGNOSIS — C90.00: ICD-10-CM

## 2021-06-30 LAB
ALBUMIN SERPL-MCNC: 3.1 G/DL (ref 3.4–5)
ALP SERPL-CCNC: 102 U/L (ref 45–117)
ALT SERPL-CCNC: 9 U/L (ref 13–61)
ANION GAP SERPL CALC-SCNC: 8 MMOL/L (ref 8–16)
ANISOCYTOSIS BLD QL: (no result)
AST SERPL-CCNC: 12 U/L (ref 15–37)
BASOPHILS # BLD: 0.2 % (ref 0–2)
BILIRUB SERPL-MCNC: 0.9 MG/DL (ref 0.2–1)
BUN SERPL-MCNC: 15.9 MG/DL (ref 7–18)
CALCIUM SERPL-MCNC: 8.3 MG/DL (ref 8.5–10.1)
CHLORIDE SERPL-SCNC: 108 MMOL/L (ref 98–107)
CO2 SERPL-SCNC: 24 MMOL/L (ref 21–32)
CREAT SERPL-MCNC: 1.3 MG/DL (ref 0.55–1.3)
DEPRECATED RDW RBC AUTO: 21.1 % (ref 11.9–15.9)
EOSINOPHIL # BLD: 0.4 % (ref 0–4.5)
GLUCOSE SERPL-MCNC: 111 MG/DL (ref 74–106)
HCT VFR BLD CALC: 31.9 % (ref 35.4–49)
HGB BLD-MCNC: 10.7 GM/DL (ref 11.7–16.9)
LYMPHOCYTES # BLD: 7.7 % (ref 8–40)
MACROCYTES BLD QL: 0
MCH RBC QN AUTO: 33.2 PG (ref 25.7–33.7)
MCHC RBC AUTO-ENTMCNC: 33.7 G/DL (ref 32–35.9)
MCV RBC: 98.4 FL (ref 80–96)
MONOCYTES # BLD AUTO: 10.5 % (ref 3.8–10.2)
NEUTROPHILS # BLD: 81.2 % (ref 42.8–82.8)
PLATELET # BLD AUTO: 96 10^3/UL (ref 134–434)
PLATELET BLD QL SMEAR: (no result)
PMV BLD: 8.6 FL (ref 7.5–11.1)
PROT SERPL-MCNC: 5.4 G/DL (ref 6.4–8.2)
RBC # BLD AUTO: 3.24 M/MM3 (ref 4–5.6)
SODIUM SERPL-SCNC: 139 MMOL/L (ref 136–145)
WBC # BLD AUTO: 4.4 K/MM3 (ref 4–10)

## 2021-06-30 PROCEDURE — 3E033GC INTRODUCTION OF OTHER THERAPEUTIC SUBSTANCE INTO PERIPHERAL VEIN, PERCUTANEOUS APPROACH: ICD-10-PCS | Performed by: INTERNAL MEDICINE

## 2021-06-30 PROCEDURE — 3E01305 INTRODUCTION OF OTHER ANTINEOPLASTIC INTO SUBCUTANEOUS TISSUE, PERCUTANEOUS APPROACH: ICD-10-PCS | Performed by: INTERNAL MEDICINE

## 2021-07-07 ENCOUNTER — HOSPITAL ENCOUNTER (OUTPATIENT)
Dept: HOSPITAL 74 - JONCCHEMO | Age: 81
Discharge: HOME | End: 2021-07-07
Attending: INTERNAL MEDICINE
Payer: COMMERCIAL

## 2021-07-07 VITALS — TEMPERATURE: 98 F

## 2021-07-07 VITALS — DIASTOLIC BLOOD PRESSURE: 61 MMHG | HEART RATE: 76 BPM | SYSTOLIC BLOOD PRESSURE: 115 MMHG

## 2021-07-07 DIAGNOSIS — C90.00: ICD-10-CM

## 2021-07-07 DIAGNOSIS — Z51.11: Primary | ICD-10-CM

## 2021-07-07 LAB
ALBUMIN SERPL-MCNC: 3.1 G/DL (ref 3.4–5)
ALP SERPL-CCNC: 100 U/L (ref 45–117)
ALT SERPL-CCNC: 7 U/L (ref 13–61)
ANION GAP SERPL CALC-SCNC: 6 MMOL/L (ref 8–16)
ANISOCYTOSIS BLD QL: (no result)
AST SERPL-CCNC: 8 U/L (ref 15–37)
BASOPHILS # BLD: 0.3 % (ref 0–2)
BILIRUB SERPL-MCNC: 1.1 MG/DL (ref 0.2–1)
BUN SERPL-MCNC: 18.6 MG/DL (ref 7–18)
CALCIUM SERPL-MCNC: 8.4 MG/DL (ref 8.5–10.1)
CHLORIDE SERPL-SCNC: 108 MMOL/L (ref 98–107)
CO2 SERPL-SCNC: 25 MMOL/L (ref 21–32)
CREAT SERPL-MCNC: 1.5 MG/DL (ref 0.55–1.3)
DEPRECATED RDW RBC AUTO: 21 % (ref 11.9–15.9)
EOSINOPHIL # BLD: 1 % (ref 0–4.5)
GLUCOSE SERPL-MCNC: 104 MG/DL (ref 74–106)
HCT VFR BLD CALC: 33.1 % (ref 35.4–49)
HGB BLD-MCNC: 11.1 GM/DL (ref 11.7–16.9)
LYMPHOCYTES # BLD: 12.5 % (ref 8–40)
MACROCYTES BLD QL: (no result)
MAGNESIUM SERPL-MCNC: 2 MG/DL (ref 1.8–2.4)
MCH RBC QN AUTO: 33.3 PG (ref 25.7–33.7)
MCHC RBC AUTO-ENTMCNC: 33.6 G/DL (ref 32–35.9)
MCV RBC: 98.9 FL (ref 80–96)
MONOCYTES # BLD AUTO: 9.6 % (ref 3.8–10.2)
NEUTROPHILS # BLD: 76.6 % (ref 42.8–82.8)
OVALOCYTES BLD QL SMEAR: (no result)
PLATELET # BLD AUTO: 116 10^3/UL (ref 134–434)
PLATELET BLD QL SMEAR: (no result)
PMV BLD: 8.1 FL (ref 7.5–11.1)
PROT SERPL-MCNC: 5.5 G/DL (ref 6.4–8.2)
RBC # BLD AUTO: 3.35 M/MM3 (ref 4–5.6)
SODIUM SERPL-SCNC: 140 MMOL/L (ref 136–145)
WBC # BLD AUTO: 4.3 K/MM3 (ref 4–10)

## 2021-07-07 PROCEDURE — 3E01305 INTRODUCTION OF OTHER ANTINEOPLASTIC INTO SUBCUTANEOUS TISSUE, PERCUTANEOUS APPROACH: ICD-10-PCS | Performed by: INTERNAL MEDICINE

## 2021-07-07 PROCEDURE — 3E033GC INTRODUCTION OF OTHER THERAPEUTIC SUBSTANCE INTO PERIPHERAL VEIN, PERCUTANEOUS APPROACH: ICD-10-PCS | Performed by: INTERNAL MEDICINE

## 2021-07-15 ENCOUNTER — HOSPITAL ENCOUNTER (OUTPATIENT)
Dept: HOSPITAL 74 - JONCCHEMO | Age: 81
Discharge: HOME | End: 2021-07-15
Attending: INTERNAL MEDICINE
Payer: COMMERCIAL

## 2021-07-15 VITALS — HEART RATE: 74 BPM | SYSTOLIC BLOOD PRESSURE: 144 MMHG | DIASTOLIC BLOOD PRESSURE: 57 MMHG

## 2021-07-15 VITALS — TEMPERATURE: 98.3 F

## 2021-07-15 DIAGNOSIS — C90.00: ICD-10-CM

## 2021-07-15 DIAGNOSIS — Z51.11: Primary | ICD-10-CM

## 2021-07-15 LAB
ALBUMIN SERPL-MCNC: 2.8 G/DL (ref 3.4–5)
ALP SERPL-CCNC: 86 U/L (ref 45–117)
ALT SERPL-CCNC: < 6 U/L (ref 13–61)
ANION GAP SERPL CALC-SCNC: 6 MMOL/L (ref 8–16)
AST SERPL-CCNC: 5 U/L (ref 15–37)
BASOPHILS # BLD: 0.3 % (ref 0–2)
BILIRUB SERPL-MCNC: 0.6 MG/DL (ref 0.2–1)
BUN SERPL-MCNC: 14.9 MG/DL (ref 7–18)
CALCIUM SERPL-MCNC: 7.7 MG/DL (ref 8.5–10.1)
CHLORIDE SERPL-SCNC: 111 MMOL/L (ref 98–107)
CO2 SERPL-SCNC: 26 MMOL/L (ref 21–32)
CREAT SERPL-MCNC: 1.5 MG/DL (ref 0.55–1.3)
DEPRECATED RDW RBC AUTO: 21 % (ref 11.9–15.9)
EOSINOPHIL # BLD: 1.9 % (ref 0–4.5)
GLUCOSE SERPL-MCNC: 82 MG/DL (ref 74–106)
HCT VFR BLD CALC: 31.6 % (ref 35.4–49)
HGB BLD-MCNC: 10.6 GM/DL (ref 11.7–16.9)
LYMPHOCYTES # BLD: 9.4 % (ref 8–40)
MCH RBC QN AUTO: 33.6 PG (ref 25.7–33.7)
MCHC RBC AUTO-ENTMCNC: 33.6 G/DL (ref 32–35.9)
MCV RBC: 100.1 FL (ref 80–96)
MONOCYTES # BLD AUTO: 6.8 % (ref 3.8–10.2)
NEUTROPHILS # BLD: 81.6 % (ref 42.8–82.8)
PLATELET # BLD AUTO: 95 10^3/UL (ref 134–434)
PMV BLD: 9.2 FL (ref 7.5–11.1)
PROT SERPL-MCNC: 5 G/DL (ref 6.4–8.2)
RBC # BLD AUTO: 3.15 M/MM3 (ref 4–5.6)
SODIUM SERPL-SCNC: 143 MMOL/L (ref 136–145)
WBC # BLD AUTO: 4.5 K/MM3 (ref 4–10)

## 2021-07-15 PROCEDURE — 3E01305 INTRODUCTION OF OTHER ANTINEOPLASTIC INTO SUBCUTANEOUS TISSUE, PERCUTANEOUS APPROACH: ICD-10-PCS | Performed by: INTERNAL MEDICINE

## 2021-07-15 PROCEDURE — 3E033GC INTRODUCTION OF OTHER THERAPEUTIC SUBSTANCE INTO PERIPHERAL VEIN, PERCUTANEOUS APPROACH: ICD-10-PCS | Performed by: INTERNAL MEDICINE

## 2021-07-22 ENCOUNTER — HOSPITAL ENCOUNTER (OUTPATIENT)
Dept: HOSPITAL 74 - JONCCHEMO | Age: 81
Discharge: HOME | End: 2021-07-22
Attending: INTERNAL MEDICINE
Payer: COMMERCIAL

## 2021-07-22 VITALS — HEART RATE: 62 BPM | DIASTOLIC BLOOD PRESSURE: 52 MMHG | SYSTOLIC BLOOD PRESSURE: 101 MMHG

## 2021-07-22 VITALS — TEMPERATURE: 98.5 F

## 2021-07-22 DIAGNOSIS — Z51.11: Primary | ICD-10-CM

## 2021-07-22 DIAGNOSIS — C90.00: ICD-10-CM

## 2021-07-22 LAB
ALBUMIN SERPL-MCNC: 2.8 G/DL (ref 3.4–5)
ALP SERPL-CCNC: 78 U/L (ref 45–117)
ALT SERPL-CCNC: 11 U/L (ref 13–61)
ANION GAP SERPL CALC-SCNC: 7 MMOL/L (ref 8–16)
ANISOCYTOSIS BLD QL: (no result)
AST SERPL-CCNC: 10 U/L (ref 15–37)
BASOPHILS # BLD: 0.3 % (ref 0–2)
BILIRUB SERPL-MCNC: 0.6 MG/DL (ref 0.2–1)
BUN SERPL-MCNC: 14 MG/DL (ref 7–18)
CALCIUM SERPL-MCNC: 8.2 MG/DL (ref 8.5–10.1)
CHLORIDE SERPL-SCNC: 110 MMOL/L (ref 98–107)
CO2 SERPL-SCNC: 25 MMOL/L (ref 21–32)
CREAT SERPL-MCNC: 1.4 MG/DL (ref 0.55–1.3)
DEPRECATED RDW RBC AUTO: 20.5 % (ref 11.9–15.9)
EOSINOPHIL # BLD: 5.2 % (ref 0–4.5)
GLUCOSE SERPL-MCNC: 70 MG/DL (ref 74–106)
HCT VFR BLD CALC: 32.2 % (ref 35.4–49)
HGB BLD-MCNC: 11 GM/DL (ref 11.7–16.9)
LYMPHOCYTES # BLD: 9 % (ref 8–40)
MACROCYTES BLD QL: (no result)
MCH RBC QN AUTO: 34.3 PG (ref 25.7–33.7)
MCHC RBC AUTO-ENTMCNC: 34 G/DL (ref 32–35.9)
MCV RBC: 100.8 FL (ref 80–96)
MONOCYTES # BLD AUTO: 14.2 % (ref 3.8–10.2)
NEUTROPHILS # BLD: 71.3 % (ref 42.8–82.8)
OVALOCYTES BLD QL SMEAR: (no result)
PLATELET # BLD AUTO: 67 10^3/UL (ref 134–434)
PLATELET BLD QL SMEAR: (no result)
PMV BLD: 9.7 FL (ref 7.5–11.1)
PROT SERPL-MCNC: 5.2 G/DL (ref 6.4–8.2)
RBC # BLD AUTO: 3.2 M/MM3 (ref 4–5.6)
SODIUM SERPL-SCNC: 142 MMOL/L (ref 136–145)
WBC # BLD AUTO: 4.2 K/MM3 (ref 4–10)

## 2021-07-22 PROCEDURE — 3E01305 INTRODUCTION OF OTHER ANTINEOPLASTIC INTO SUBCUTANEOUS TISSUE, PERCUTANEOUS APPROACH: ICD-10-PCS | Performed by: INTERNAL MEDICINE

## 2021-07-22 PROCEDURE — 3E033GC INTRODUCTION OF OTHER THERAPEUTIC SUBSTANCE INTO PERIPHERAL VEIN, PERCUTANEOUS APPROACH: ICD-10-PCS | Performed by: INTERNAL MEDICINE

## 2021-07-29 ENCOUNTER — HOSPITAL ENCOUNTER (OUTPATIENT)
Dept: HOSPITAL 74 - JONCCHEMO | Age: 81
Discharge: HOME | End: 2021-07-29
Attending: INTERNAL MEDICINE
Payer: COMMERCIAL

## 2021-07-29 VITALS — HEART RATE: 76 BPM | DIASTOLIC BLOOD PRESSURE: 63 MMHG | SYSTOLIC BLOOD PRESSURE: 134 MMHG | TEMPERATURE: 98.5 F

## 2021-07-29 DIAGNOSIS — Z51.11: Primary | ICD-10-CM

## 2021-07-29 DIAGNOSIS — C90.00: ICD-10-CM

## 2021-07-29 LAB
ALBUMIN SERPL-MCNC: 2.7 G/DL (ref 3.4–5)
ALP SERPL-CCNC: 85 U/L (ref 45–117)
ALT SERPL-CCNC: 9 U/L (ref 13–61)
ANION GAP SERPL CALC-SCNC: 8 MMOL/L (ref 8–16)
AST SERPL-CCNC: 6 U/L (ref 15–37)
BASOPHILS # BLD: 0.6 % (ref 0–2)
BILIRUB DIRECT SERPL-MCNC: 148 U/L (ref 87–246)
BILIRUB SERPL-MCNC: 0.4 MG/DL (ref 0.2–1)
BUN SERPL-MCNC: 12.4 MG/DL (ref 7–18)
CALCIUM SERPL-MCNC: 7.6 MG/DL (ref 8.5–10.1)
CHLORIDE SERPL-SCNC: 109 MMOL/L (ref 98–107)
CO2 SERPL-SCNC: 25 MMOL/L (ref 21–32)
CREAT SERPL-MCNC: 1.2 MG/DL (ref 0.55–1.3)
DEPRECATED RDW RBC AUTO: 20.1 % (ref 11.9–15.9)
EOSINOPHIL # BLD: 4.6 % (ref 0–4.5)
GLUCOSE SERPL-MCNC: 88 MG/DL (ref 74–106)
HCT VFR BLD CALC: 30.1 % (ref 35.4–49)
HGB BLD-MCNC: 10.4 GM/DL (ref 11.7–16.9)
LYMPHOCYTES # BLD: 10.5 % (ref 8–40)
MCH RBC QN AUTO: 35 PG (ref 25.7–33.7)
MCHC RBC AUTO-ENTMCNC: 34.6 G/DL (ref 32–35.9)
MCV RBC: 101.1 FL (ref 80–96)
MONOCYTES # BLD AUTO: 18.5 % (ref 3.8–10.2)
NEUTROPHILS # BLD: 65.8 % (ref 42.8–82.8)
PLATELET # BLD AUTO: 116 10^3/UL (ref 134–434)
PMV BLD: 8.1 FL (ref 7.5–11.1)
PROT SERPL-MCNC: 5 G/DL (ref 6.4–8.2)
RBC # BLD AUTO: 2.98 M/MM3 (ref 4–5.6)
SODIUM SERPL-SCNC: 142 MMOL/L (ref 136–145)
WBC # BLD AUTO: 3.9 K/MM3 (ref 4–10)

## 2021-08-12 ENCOUNTER — HOSPITAL ENCOUNTER (OUTPATIENT)
Dept: HOSPITAL 74 - JONCCHEMO | Age: 81
Discharge: HOME | End: 2021-08-12
Attending: INTERNAL MEDICINE
Payer: COMMERCIAL

## 2021-08-12 VITALS — TEMPERATURE: 98.4 F

## 2021-08-12 VITALS — SYSTOLIC BLOOD PRESSURE: 110 MMHG | DIASTOLIC BLOOD PRESSURE: 71 MMHG | HEART RATE: 71 BPM

## 2021-08-12 DIAGNOSIS — Z51.11: Primary | ICD-10-CM

## 2021-08-12 DIAGNOSIS — C90.00: ICD-10-CM

## 2021-08-12 LAB
ALBUMIN SERPL-MCNC: 2.7 G/DL (ref 3.4–5)
ALP SERPL-CCNC: 81 U/L (ref 45–117)
ALT SERPL-CCNC: 6 U/L (ref 13–61)
ANION GAP SERPL CALC-SCNC: 7 MMOL/L (ref 8–16)
AST SERPL-CCNC: 6 U/L (ref 15–37)
BASOPHILS # BLD: 0.5 % (ref 0–2)
BILIRUB SERPL-MCNC: 0.5 MG/DL (ref 0.2–1)
BUN SERPL-MCNC: 12.7 MG/DL (ref 7–18)
CALCIUM SERPL-MCNC: 7.5 MG/DL (ref 8.5–10.1)
CHLORIDE SERPL-SCNC: 111 MMOL/L (ref 98–107)
CO2 SERPL-SCNC: 26 MMOL/L (ref 21–32)
CREAT SERPL-MCNC: 1.2 MG/DL (ref 0.55–1.3)
DEPRECATED RDW RBC AUTO: 18.6 % (ref 11.9–15.9)
EOSINOPHIL # BLD: 4 % (ref 0–4.5)
GLUCOSE SERPL-MCNC: 88 MG/DL (ref 74–106)
HCT VFR BLD CALC: 29.1 % (ref 35.4–49)
HGB BLD-MCNC: 10.1 GM/DL (ref 11.7–16.9)
LYMPHOCYTES # BLD: 9.9 % (ref 8–40)
MCH RBC QN AUTO: 35.4 PG (ref 25.7–33.7)
MCHC RBC AUTO-ENTMCNC: 34.8 G/DL (ref 32–35.9)
MCV RBC: 101.7 FL (ref 80–96)
MONOCYTES # BLD AUTO: 11.2 % (ref 3.8–10.2)
NEUTROPHILS # BLD: 74.4 % (ref 42.8–82.8)
PLATELET # BLD AUTO: 85 10^3/UL (ref 134–434)
PMV BLD: 8.5 FL (ref 7.5–11.1)
PROT SERPL-MCNC: 5 G/DL (ref 6.4–8.2)
RBC # BLD AUTO: 2.86 M/MM3 (ref 4–5.6)
SODIUM SERPL-SCNC: 144 MMOL/L (ref 136–145)
WBC # BLD AUTO: 5.9 K/MM3 (ref 4–10)

## 2021-08-12 PROCEDURE — 3E043GC INTRODUCTION OF OTHER THERAPEUTIC SUBSTANCE INTO CENTRAL VEIN, PERCUTANEOUS APPROACH: ICD-10-PCS | Performed by: INTERNAL MEDICINE

## 2021-08-12 PROCEDURE — 3E01305 INTRODUCTION OF OTHER ANTINEOPLASTIC INTO SUBCUTANEOUS TISSUE, PERCUTANEOUS APPROACH: ICD-10-PCS | Performed by: INTERNAL MEDICINE

## 2021-08-13 LAB
IGA SER-ACNC: 118 MG/DL (ref 61–437)
IGG SERPL-MCNC: 338 MG/DL (ref 603–1613)
IGM SERPL-MCNC: 11 MG/DL (ref 15–143)
KAPPA LC FREE SER-MCNC: 18.7 MG/L (ref 3.3–19.4)

## 2021-08-26 ENCOUNTER — HOSPITAL ENCOUNTER (OUTPATIENT)
Dept: HOSPITAL 74 - JONCCHEMO | Age: 81
Discharge: HOME | End: 2021-08-26
Attending: INTERNAL MEDICINE
Payer: COMMERCIAL

## 2021-08-26 VITALS — HEART RATE: 62 BPM | DIASTOLIC BLOOD PRESSURE: 57 MMHG | SYSTOLIC BLOOD PRESSURE: 122 MMHG

## 2021-08-26 VITALS — TEMPERATURE: 98.3 F

## 2021-08-26 DIAGNOSIS — Z51.11: Primary | ICD-10-CM

## 2021-08-26 LAB
ALBUMIN SERPL-MCNC: 2.8 G/DL (ref 3.4–5)
ALP SERPL-CCNC: 82 U/L (ref 45–117)
ALT SERPL-CCNC: 9 U/L (ref 13–61)
ANION GAP SERPL CALC-SCNC: 5 MMOL/L (ref 8–16)
AST SERPL-CCNC: 9 U/L (ref 15–37)
BASOPHILS # BLD: 0.3 % (ref 0–2)
BILIRUB SERPL-MCNC: 0.7 MG/DL (ref 0.2–1)
BUN SERPL-MCNC: 12.7 MG/DL (ref 7–18)
CALCIUM SERPL-MCNC: 6.9 MG/DL (ref 8.5–10.1)
CHLORIDE SERPL-SCNC: 111 MMOL/L (ref 98–107)
CO2 SERPL-SCNC: 27 MMOL/L (ref 21–32)
CREAT SERPL-MCNC: 1.3 MG/DL (ref 0.55–1.3)
DEPRECATED RDW RBC AUTO: 17.2 % (ref 11.9–15.9)
EOSINOPHIL # BLD: 6.2 % (ref 0–4.5)
GLUCOSE SERPL-MCNC: 83 MG/DL (ref 74–106)
HCT VFR BLD CALC: 28 % (ref 35.4–49)
HGB BLD-MCNC: 9.9 GM/DL (ref 11.7–16.9)
LYMPHOCYTES # BLD: 6.8 % (ref 8–40)
MCH RBC QN AUTO: 35.7 PG (ref 25.7–33.7)
MCHC RBC AUTO-ENTMCNC: 35.4 G/DL (ref 32–35.9)
MCV RBC: 100.8 FL (ref 80–96)
MONOCYTES # BLD AUTO: 16.5 % (ref 3.8–10.2)
NEUTROPHILS # BLD: 70.2 % (ref 42.8–82.8)
PLATELET # BLD AUTO: 64 10^3/UL (ref 134–434)
PMV BLD: 9.5 FL (ref 7.5–11.1)
PROT SERPL-MCNC: 5.1 G/DL (ref 6.4–8.2)
RBC # BLD AUTO: 2.78 M/MM3 (ref 4–5.6)
SODIUM SERPL-SCNC: 143 MMOL/L (ref 136–145)
WBC # BLD AUTO: 6.3 K/MM3 (ref 4–10)

## 2021-08-26 PROCEDURE — 3E033GC INTRODUCTION OF OTHER THERAPEUTIC SUBSTANCE INTO PERIPHERAL VEIN, PERCUTANEOUS APPROACH: ICD-10-PCS | Performed by: INTERNAL MEDICINE

## 2021-08-26 PROCEDURE — 3E01305 INTRODUCTION OF OTHER ANTINEOPLASTIC INTO SUBCUTANEOUS TISSUE, PERCUTANEOUS APPROACH: ICD-10-PCS | Performed by: INTERNAL MEDICINE

## 2021-09-16 ENCOUNTER — HOSPITAL ENCOUNTER (OUTPATIENT)
Dept: HOSPITAL 74 - JONCCHEMO | Age: 81
Discharge: HOME | End: 2021-09-16
Attending: INTERNAL MEDICINE
Payer: COMMERCIAL

## 2021-09-16 VITALS — HEART RATE: 72 BPM | DIASTOLIC BLOOD PRESSURE: 74 MMHG | TEMPERATURE: 98.3 F | SYSTOLIC BLOOD PRESSURE: 165 MMHG

## 2021-09-16 DIAGNOSIS — Z51.11: Primary | ICD-10-CM

## 2021-09-16 DIAGNOSIS — C90.00: ICD-10-CM

## 2021-09-16 LAB
ALBUMIN SERPL-MCNC: 3 G/DL (ref 3.4–5)
ALP SERPL-CCNC: 90 U/L (ref 45–117)
ALT SERPL-CCNC: 7 U/L (ref 13–61)
ANION GAP SERPL CALC-SCNC: 6 MMOL/L (ref 8–16)
AST SERPL-CCNC: 4 U/L (ref 15–37)
BASOPHILS # BLD: 0.9 % (ref 0–2)
BILIRUB SERPL-MCNC: 0.7 MG/DL (ref 0.2–1)
BUN SERPL-MCNC: 20.5 MG/DL (ref 7–18)
CALCIUM SERPL-MCNC: 8.1 MG/DL (ref 8.5–10.1)
CHLORIDE SERPL-SCNC: 111 MMOL/L (ref 98–107)
CO2 SERPL-SCNC: 25 MMOL/L (ref 21–32)
CREAT SERPL-MCNC: 1.5 MG/DL (ref 0.55–1.3)
DEPRECATED RDW RBC AUTO: 15.5 % (ref 11.9–15.9)
EOSINOPHIL # BLD: 3.9 % (ref 0–4.5)
GLUCOSE SERPL-MCNC: 82 MG/DL (ref 74–106)
HCT VFR BLD CALC: 30.3 % (ref 35.4–49)
HGB BLD-MCNC: 10.5 GM/DL (ref 11.7–16.9)
LYMPHOCYTES # BLD: 9.9 % (ref 8–40)
MCH RBC QN AUTO: 35 PG (ref 25.7–33.7)
MCHC RBC AUTO-ENTMCNC: 34.6 G/DL (ref 32–35.9)
MCV RBC: 101 FL (ref 80–96)
MONOCYTES # BLD AUTO: 15.2 % (ref 3.8–10.2)
NEUTROPHILS # BLD: 70.1 % (ref 42.8–82.8)
PLATELET # BLD AUTO: 62 10^3/UL (ref 134–434)
PMV BLD: 7.9 FL (ref 7.5–11.1)
PROT SERPL-MCNC: 5.5 G/DL (ref 6.4–8.2)
RBC # BLD AUTO: 3 M/MM3 (ref 4–5.6)
SODIUM SERPL-SCNC: 142 MMOL/L (ref 136–145)
WBC # BLD AUTO: 4.6 K/MM3 (ref 4–10)

## 2021-09-16 PROCEDURE — 3E01305 INTRODUCTION OF OTHER ANTINEOPLASTIC INTO SUBCUTANEOUS TISSUE, PERCUTANEOUS APPROACH: ICD-10-PCS | Performed by: INTERNAL MEDICINE

## 2021-09-16 PROCEDURE — 3E033GC INTRODUCTION OF OTHER THERAPEUTIC SUBSTANCE INTO PERIPHERAL VEIN, PERCUTANEOUS APPROACH: ICD-10-PCS | Performed by: INTERNAL MEDICINE

## 2021-09-18 LAB — KAPPA LC FREE SER-MCNC: 25.5 MG/L (ref 3.3–19.4)

## 2021-09-30 ENCOUNTER — HOSPITAL ENCOUNTER (OUTPATIENT)
Dept: HOSPITAL 74 - JONCCHEMO | Age: 81
Discharge: HOME | End: 2021-09-30
Attending: INTERNAL MEDICINE
Payer: COMMERCIAL

## 2021-09-30 DIAGNOSIS — Z53.8: Primary | ICD-10-CM

## 2021-09-30 LAB
ALBUMIN SERPL-MCNC: 3 G/DL (ref 3.4–5)
ALP SERPL-CCNC: 88 U/L (ref 45–117)
ALT SERPL-CCNC: < 6 U/L (ref 13–61)
ANION GAP SERPL CALC-SCNC: 8 MMOL/L (ref 8–16)
AST SERPL-CCNC: 5 U/L (ref 15–37)
BASOPHILS # BLD: 0.1 % (ref 0–2)
BILIRUB SERPL-MCNC: 1 MG/DL (ref 0.2–1)
BUN SERPL-MCNC: 15.8 MG/DL (ref 7–18)
CALCIUM SERPL-MCNC: 7.9 MG/DL (ref 8.5–10.1)
CHLORIDE SERPL-SCNC: 112 MMOL/L (ref 98–107)
CO2 SERPL-SCNC: 24 MMOL/L (ref 21–32)
CREAT SERPL-MCNC: 1.5 MG/DL (ref 0.55–1.3)
DEPRECATED RDW RBC AUTO: 16 % (ref 11.9–15.9)
EOSINOPHIL # BLD: 5.3 % (ref 0–4.5)
GLUCOSE SERPL-MCNC: 82 MG/DL (ref 74–106)
HCT VFR BLD CALC: 28.4 % (ref 35.4–49)
HGB BLD-MCNC: 9.9 GM/DL (ref 11.7–16.9)
LYMPHOCYTES # BLD: 6.9 % (ref 8–40)
MCH RBC QN AUTO: 35.5 PG (ref 25.7–33.7)
MCHC RBC AUTO-ENTMCNC: 34.9 G/DL (ref 32–35.9)
MCV RBC: 101.9 FL (ref 80–96)
MONOCYTES # BLD AUTO: 14.5 % (ref 3.8–10.2)
NEUTROPHILS # BLD: 73.2 % (ref 42.8–82.8)
PLATELET # BLD AUTO: 44 10^3/UL (ref 134–434)
PMV BLD: 10.4 FL (ref 7.5–11.1)
PROT SERPL-MCNC: 5.5 G/DL (ref 6.4–8.2)
RBC # BLD AUTO: 2.79 M/MM3 (ref 4–5.6)
SODIUM SERPL-SCNC: 144 MMOL/L (ref 136–145)
WBC # BLD AUTO: 5.9 K/MM3 (ref 4–10)

## 2021-10-01 LAB — KAPPA LC FREE SER-MCNC: 32 MG/L (ref 3.3–19.4)

## 2021-10-07 ENCOUNTER — HOSPITAL ENCOUNTER (OUTPATIENT)
Dept: HOSPITAL 74 - JONCCHEMO | Age: 81
Discharge: HOME | End: 2021-10-07
Attending: INTERNAL MEDICINE
Payer: COMMERCIAL

## 2021-10-07 DIAGNOSIS — Z53.8: Primary | ICD-10-CM

## 2021-10-07 LAB
ALBUMIN SERPL-MCNC: 2.9 G/DL (ref 3.4–5)
ALP SERPL-CCNC: 89 U/L (ref 45–117)
ALT SERPL-CCNC: < 6 U/L (ref 13–61)
ANION GAP SERPL CALC-SCNC: 7 MMOL/L (ref 8–16)
AST SERPL-CCNC: 9 U/L (ref 15–37)
BASOPHILS # BLD: 0.6 % (ref 0–2)
BILIRUB SERPL-MCNC: 0.5 MG/DL (ref 0.2–1)
BUN SERPL-MCNC: 17.6 MG/DL (ref 7–18)
CALCIUM SERPL-MCNC: 7.4 MG/DL (ref 8.5–10.1)
CHLORIDE SERPL-SCNC: 112 MMOL/L (ref 98–107)
CO2 SERPL-SCNC: 24 MMOL/L (ref 21–32)
CREAT SERPL-MCNC: 1.4 MG/DL (ref 0.55–1.3)
DEPRECATED RDW RBC AUTO: 15.9 % (ref 11.9–15.9)
EOSINOPHIL # BLD: 5.2 % (ref 0–4.5)
GLUCOSE SERPL-MCNC: 105 MG/DL (ref 74–106)
HCT VFR BLD CALC: 28 % (ref 35.4–49)
HGB BLD-MCNC: 9.7 GM/DL (ref 11.7–16.9)
LYMPHOCYTES # BLD: 9.4 % (ref 8–40)
MCH RBC QN AUTO: 35.1 PG (ref 25.7–33.7)
MCHC RBC AUTO-ENTMCNC: 34.7 G/DL (ref 32–35.9)
MCV RBC: 101.1 FL (ref 80–96)
MONOCYTES # BLD AUTO: 16.9 % (ref 3.8–10.2)
NEUTROPHILS # BLD: 67.9 % (ref 42.8–82.8)
PLATELET # BLD AUTO: 48 10^3/UL (ref 134–434)
PMV BLD: 10.9 FL (ref 7.5–11.1)
PROT SERPL-MCNC: 5.6 G/DL (ref 6.4–8.2)
RBC # BLD AUTO: 2.77 M/MM3 (ref 4–5.6)
SODIUM SERPL-SCNC: 143 MMOL/L (ref 136–145)
WBC # BLD AUTO: 3.6 K/MM3 (ref 4–10)

## 2021-10-09 LAB — KAPPA LC FREE SER-MCNC: 27.4 MG/L (ref 3.3–19.4)

## 2021-10-14 ENCOUNTER — HOSPITAL ENCOUNTER (OUTPATIENT)
Dept: HOSPITAL 74 - JONCCHEMO | Age: 81
Discharge: HOME | End: 2021-10-14
Attending: INTERNAL MEDICINE
Payer: COMMERCIAL

## 2021-10-14 DIAGNOSIS — Z53.8: Primary | ICD-10-CM

## 2021-10-14 LAB
ALBUMIN SERPL-MCNC: 2.5 G/DL (ref 3.4–5)
ALP SERPL-CCNC: 94 U/L (ref 45–117)
ALT SERPL-CCNC: 8 U/L (ref 13–61)
ANION GAP SERPL CALC-SCNC: 8 MMOL/L (ref 8–16)
AST SERPL-CCNC: 8 U/L (ref 15–37)
BASOPHILS # BLD: 0.6 % (ref 0–2)
BILIRUB SERPL-MCNC: 0.9 MG/DL (ref 0.2–1)
BUN SERPL-MCNC: 17.5 MG/DL (ref 7–18)
CALCIUM SERPL-MCNC: 7.7 MG/DL (ref 8.5–10.1)
CHLORIDE SERPL-SCNC: 108 MMOL/L (ref 98–107)
CO2 SERPL-SCNC: 23 MMOL/L (ref 21–32)
CREAT SERPL-MCNC: 1.3 MG/DL (ref 0.55–1.3)
DEPRECATED RDW RBC AUTO: 15.9 % (ref 11.9–15.9)
EOSINOPHIL # BLD: 6.1 % (ref 0–4.5)
GLUCOSE SERPL-MCNC: 95 MG/DL (ref 74–106)
HCT VFR BLD CALC: 26.4 % (ref 35.4–49)
HGB BLD-MCNC: 8.9 GM/DL (ref 11.7–16.9)
LYMPHOCYTES # BLD: 9 % (ref 8–40)
MCH RBC QN AUTO: 33.3 PG (ref 25.7–33.7)
MCHC RBC AUTO-ENTMCNC: 33.8 G/DL (ref 32–35.9)
MCV RBC: 98.8 FL (ref 80–96)
MONOCYTES # BLD AUTO: 11 % (ref 3.8–10.2)
NEUTROPHILS # BLD: 73.3 % (ref 42.8–82.8)
PLATELET # BLD AUTO: 47 10^3/UL (ref 134–434)
PMV BLD: 8.9 FL (ref 7.5–11.1)
PROT SERPL-MCNC: 5.4 G/DL (ref 6.4–8.2)
RBC # BLD AUTO: 2.67 M/MM3 (ref 4–5.6)
SODIUM SERPL-SCNC: 140 MMOL/L (ref 136–145)
WBC # BLD AUTO: 3.2 K/MM3 (ref 4–10)

## 2021-10-21 ENCOUNTER — HOSPITAL ENCOUNTER (OUTPATIENT)
Dept: HOSPITAL 74 - JONCCHEMO | Age: 81
Discharge: HOME | End: 2021-10-21
Attending: INTERNAL MEDICINE
Payer: COMMERCIAL

## 2021-10-21 DIAGNOSIS — Z53.8: Primary | ICD-10-CM

## 2021-10-21 LAB
ALBUMIN SERPL-MCNC: 2.2 G/DL (ref 3.4–5)
ALP SERPL-CCNC: 124 U/L (ref 45–117)
ALT SERPL-CCNC: 9 U/L (ref 13–61)
ANION GAP SERPL CALC-SCNC: 11 MMOL/L (ref 8–16)
AST SERPL-CCNC: 13 U/L (ref 15–37)
BASOPHILS # BLD: 0.9 % (ref 0–2)
BILIRUB SERPL-MCNC: 0.9 MG/DL (ref 0.2–1)
BUN SERPL-MCNC: 20 MG/DL (ref 7–18)
CALCIUM SERPL-MCNC: 8.5 MG/DL (ref 8.5–10.1)
CHLORIDE SERPL-SCNC: 105 MMOL/L (ref 98–107)
CO2 SERPL-SCNC: 23 MMOL/L (ref 21–32)
CREAT SERPL-MCNC: 1.4 MG/DL (ref 0.55–1.3)
DEPRECATED RDW RBC AUTO: 16.1 % (ref 11.9–15.9)
EOSINOPHIL # BLD: 2.5 % (ref 0–4.5)
GLUCOSE SERPL-MCNC: 99 MG/DL (ref 74–106)
HCT VFR BLD CALC: 24.5 % (ref 35.4–49)
HGB BLD-MCNC: 8.6 GM/DL (ref 11.7–16.9)
LYMPHOCYTES # BLD: 4 % (ref 8–40)
MCH RBC QN AUTO: 33.9 PG (ref 25.7–33.7)
MCHC RBC AUTO-ENTMCNC: 35.2 G/DL (ref 32–35.9)
MCV RBC: 96.2 FL (ref 80–96)
MONOCYTES # BLD AUTO: 9.4 % (ref 3.8–10.2)
NEUTROPHILS # BLD: 83.2 % (ref 42.8–82.8)
PLATELET # BLD AUTO: 60 10^3/UL (ref 134–434)
PMV BLD: 9.3 FL (ref 7.5–11.1)
PROT SERPL-MCNC: 5.2 G/DL (ref 6.4–8.2)
RBC # BLD AUTO: 2.55 M/MM3 (ref 4–5.6)
SODIUM SERPL-SCNC: 139 MMOL/L (ref 136–145)
WBC # BLD AUTO: 5 K/MM3 (ref 4–10)

## 2021-11-04 ENCOUNTER — HOSPITAL ENCOUNTER (OUTPATIENT)
Dept: HOSPITAL 74 - JONCCHEMO | Age: 81
Discharge: HOME | End: 2021-11-04
Attending: INTERNAL MEDICINE
Payer: COMMERCIAL

## 2021-11-04 VITALS — TEMPERATURE: 98.2 F | SYSTOLIC BLOOD PRESSURE: 117 MMHG | HEART RATE: 84 BPM | DIASTOLIC BLOOD PRESSURE: 68 MMHG

## 2021-11-04 DIAGNOSIS — Z53.8: Primary | ICD-10-CM

## 2021-11-04 LAB
ALBUMIN SERPL-MCNC: 2.8 G/DL (ref 3.4–5)
ALP SERPL-CCNC: 115 U/L (ref 45–117)
ALT SERPL-CCNC: 7 U/L (ref 13–61)
ANION GAP SERPL CALC-SCNC: 4 MMOL/L (ref 8–16)
AST SERPL-CCNC: 9 U/L (ref 15–37)
BASOPHILS # BLD: 0.8 % (ref 0–2)
BILIRUB SERPL-MCNC: 0.7 MG/DL (ref 0.2–1)
BUN SERPL-MCNC: 15.6 MG/DL (ref 7–18)
CALCIUM SERPL-MCNC: 8.3 MG/DL (ref 8.5–10.1)
CHLORIDE SERPL-SCNC: 108 MMOL/L (ref 98–107)
CO2 SERPL-SCNC: 28 MMOL/L (ref 21–32)
CREAT SERPL-MCNC: 1.3 MG/DL (ref 0.55–1.3)
DEPRECATED RDW RBC AUTO: 17.3 % (ref 11.9–15.9)
EOSINOPHIL # BLD: 4.5 % (ref 0–4.5)
GLUCOSE SERPL-MCNC: 95 MG/DL (ref 74–106)
HCT VFR BLD CALC: 25.8 % (ref 35.4–49)
HGB BLD-MCNC: 8.6 GM/DL (ref 11.7–16.9)
LYMPHOCYTES # BLD: 11 % (ref 8–40)
MCH RBC QN AUTO: 32.9 PG (ref 25.7–33.7)
MCHC RBC AUTO-ENTMCNC: 33.4 G/DL (ref 32–35.9)
MCV RBC: 98.6 FL (ref 80–96)
MONOCYTES # BLD AUTO: 7.8 % (ref 3.8–10.2)
NEUTROPHILS # BLD: 75.9 % (ref 42.8–82.8)
PLATELET # BLD AUTO: 47 10^3/UL (ref 134–434)
PMV BLD: 7.9 FL (ref 7.5–11.1)
PROT SERPL-MCNC: 5.3 G/DL (ref 6.4–8.2)
RBC # BLD AUTO: 2.62 M/MM3 (ref 4–5.6)
SODIUM SERPL-SCNC: 140 MMOL/L (ref 136–145)
WBC # BLD AUTO: 3.9 K/MM3 (ref 4–10)

## 2021-11-18 ENCOUNTER — HOSPITAL ENCOUNTER (OUTPATIENT)
Dept: HOSPITAL 74 - JONCCHEMO | Age: 81
Discharge: HOME | End: 2021-11-18
Attending: INTERNAL MEDICINE
Payer: COMMERCIAL

## 2021-11-18 VITALS — DIASTOLIC BLOOD PRESSURE: 62 MMHG | SYSTOLIC BLOOD PRESSURE: 147 MMHG | HEART RATE: 86 BPM | TEMPERATURE: 98.3 F

## 2021-11-18 DIAGNOSIS — Z51.11: Primary | ICD-10-CM

## 2021-11-18 DIAGNOSIS — C90.00: ICD-10-CM

## 2021-11-18 LAB
ALBUMIN SERPL-MCNC: 3 G/DL (ref 3.4–5)
ALP SERPL-CCNC: 112 U/L (ref 45–117)
ALT SERPL-CCNC: 7 U/L (ref 13–61)
ANION GAP SERPL CALC-SCNC: 8 MMOL/L (ref 8–16)
ANISOCYTOSIS BLD QL: (no result)
AST SERPL-CCNC: 7 U/L (ref 15–37)
BASOPHILS # BLD: 0.6 % (ref 0–2)
BILIRUB SERPL-MCNC: 0.9 MG/DL (ref 0.2–1)
BUN SERPL-MCNC: 17.5 MG/DL (ref 7–18)
CALCIUM SERPL-MCNC: 7.8 MG/DL (ref 8.5–10.1)
CHLORIDE SERPL-SCNC: 113 MMOL/L (ref 98–107)
CO2 SERPL-SCNC: 23 MMOL/L (ref 21–32)
CREAT SERPL-MCNC: 1.2 MG/DL (ref 0.55–1.3)
DEPRECATED RDW RBC AUTO: 20.5 % (ref 11.9–15.9)
EOSINOPHIL # BLD: 1.6 % (ref 0–4.5)
GLUCOSE SERPL-MCNC: 84 MG/DL (ref 74–106)
HCT VFR BLD CALC: 24.4 % (ref 35.4–49)
HGB BLD-MCNC: 8.2 GM/DL (ref 11.7–16.9)
LYMPHOCYTES # BLD: 11.2 % (ref 8–40)
MACROCYTES BLD QL: (no result)
MCH RBC QN AUTO: 33.5 PG (ref 25.7–33.7)
MCHC RBC AUTO-ENTMCNC: 33.6 G/DL (ref 32–35.9)
MCV RBC: 99.7 FL (ref 80–96)
MONOCYTES # BLD AUTO: 10.2 % (ref 3.8–10.2)
NEUTROPHILS # BLD: 76.4 % (ref 42.8–82.8)
OVALOCYTES BLD QL SMEAR: (no result)
PLATELET # BLD AUTO: 66 10^3/UL (ref 134–434)
PLATELET BLD QL SMEAR: (no result)
PMV BLD: 8.1 FL (ref 7.5–11.1)
PROT SERPL-MCNC: 5.2 G/DL (ref 6.4–8.2)
RBC # BLD AUTO: 2.44 M/MM3 (ref 4–5.6)
SODIUM SERPL-SCNC: 143 MMOL/L (ref 136–145)
WBC # BLD AUTO: 4.2 K/MM3 (ref 4–10)

## 2021-11-18 PROCEDURE — 3E033GC INTRODUCTION OF OTHER THERAPEUTIC SUBSTANCE INTO PERIPHERAL VEIN, PERCUTANEOUS APPROACH: ICD-10-PCS | Performed by: INTERNAL MEDICINE

## 2021-11-18 PROCEDURE — 3E01305 INTRODUCTION OF OTHER ANTINEOPLASTIC INTO SUBCUTANEOUS TISSUE, PERCUTANEOUS APPROACH: ICD-10-PCS | Performed by: INTERNAL MEDICINE

## 2021-12-03 ENCOUNTER — HOSPITAL ENCOUNTER (OUTPATIENT)
Dept: HOSPITAL 74 - JONCBLOOD | Age: 81
Discharge: HOME | End: 2021-12-03
Attending: INTERNAL MEDICINE
Payer: COMMERCIAL

## 2021-12-03 VITALS — SYSTOLIC BLOOD PRESSURE: 134 MMHG | DIASTOLIC BLOOD PRESSURE: 48 MMHG | HEART RATE: 85 BPM | TEMPERATURE: 98.5 F

## 2021-12-03 DIAGNOSIS — Z53.8: Primary | ICD-10-CM

## 2021-12-03 LAB
ALBUMIN SERPL-MCNC: 3.1 G/DL (ref 3.4–5)
ALP SERPL-CCNC: 101 U/L (ref 45–117)
ALT SERPL-CCNC: 6 U/L (ref 13–61)
ANION GAP SERPL CALC-SCNC: 5 MMOL/L (ref 8–16)
ANISOCYTOSIS BLD QL: (no result)
AST SERPL-CCNC: 7 U/L (ref 15–37)
BASOPHILS # BLD: 0.5 % (ref 0–2)
BILIRUB SERPL-MCNC: 0.5 MG/DL (ref 0.2–1)
BUN SERPL-MCNC: 13.7 MG/DL (ref 7–18)
CALCIUM SERPL-MCNC: 8.8 MG/DL (ref 8.5–10.1)
CHLORIDE SERPL-SCNC: 112 MMOL/L (ref 98–107)
CO2 SERPL-SCNC: 27 MMOL/L (ref 21–32)
CREAT SERPL-MCNC: 1.3 MG/DL (ref 0.55–1.3)
DEPRECATED RDW RBC AUTO: 21.1 % (ref 11.9–15.9)
EOSINOPHIL # BLD: 1.5 % (ref 0–4.5)
GLUCOSE SERPL-MCNC: 94 MG/DL (ref 74–106)
HCT VFR BLD CALC: 25.9 % (ref 35.4–49)
HGB BLD-MCNC: 8.5 GM/DL (ref 11.7–16.9)
LYMPHOCYTES # BLD: 9.5 % (ref 8–40)
MACROCYTES BLD QL: (no result)
MAGNESIUM SERPL-MCNC: 2.5 MG/DL (ref 1.8–2.4)
MCH RBC QN AUTO: 33.2 PG (ref 25.7–33.7)
MCHC RBC AUTO-ENTMCNC: 32.9 G/DL (ref 32–35.9)
MCV RBC: 100.9 FL (ref 80–96)
MONOCYTES # BLD AUTO: 7.7 % (ref 3.8–10.2)
NEUTROPHILS # BLD: 80.8 % (ref 42.8–82.8)
OVALOCYTES BLD QL SMEAR: (no result)
PLATELET # BLD AUTO: 61 10^3/UL (ref 134–434)
PLATELET BLD QL SMEAR: (no result)
PMV BLD: 8 FL (ref 7.5–11.1)
PROT SERPL-MCNC: 5.4 G/DL (ref 6.4–8.2)
RBC # BLD AUTO: 2.57 M/MM3 (ref 4–5.6)
SODIUM SERPL-SCNC: 144 MMOL/L (ref 136–145)
WBC # BLD AUTO: 3.8 K/MM3 (ref 4–10)

## 2021-12-04 LAB
IGA SER-ACNC: 101 MG/DL (ref 61–437)
IGG SERPL-MCNC: 356 MG/DL (ref 603–1613)
IGM SERPL-MCNC: 15 MG/DL (ref 15–143)
KAPPA LC FREE SER-MCNC: 18.3 MG/L (ref 3.3–19.4)

## 2021-12-16 ENCOUNTER — HOSPITAL ENCOUNTER (OUTPATIENT)
Dept: HOSPITAL 74 - JONCCHEMO | Age: 81
Discharge: HOME | End: 2021-12-16
Attending: INTERNAL MEDICINE
Payer: COMMERCIAL

## 2021-12-16 VITALS — TEMPERATURE: 98.2 F

## 2021-12-16 VITALS — DIASTOLIC BLOOD PRESSURE: 76 MMHG | SYSTOLIC BLOOD PRESSURE: 130 MMHG | HEART RATE: 81 BPM

## 2021-12-16 DIAGNOSIS — Z51.11: Primary | ICD-10-CM

## 2021-12-16 DIAGNOSIS — C90.00: ICD-10-CM

## 2021-12-16 LAB
ALBUMIN SERPL-MCNC: 2.9 G/DL (ref 3.4–5)
ALP SERPL-CCNC: 96 U/L (ref 45–117)
ALT SERPL-CCNC: 7 U/L (ref 13–61)
ANION GAP SERPL CALC-SCNC: 7 MMOL/L (ref 8–16)
ANISOCYTOSIS BLD QL: (no result)
AST SERPL-CCNC: 12 U/L (ref 15–37)
BASOPHILS # BLD: 0.4 % (ref 0–2)
BILIRUB SERPL-MCNC: 0.7 MG/DL (ref 0.2–1)
BUN SERPL-MCNC: 20.6 MG/DL (ref 7–18)
CALCIUM SERPL-MCNC: 8.4 MG/DL (ref 8.5–10.1)
CHLORIDE SERPL-SCNC: 115 MMOL/L (ref 98–107)
CO2 SERPL-SCNC: 24 MMOL/L (ref 21–32)
CREAT SERPL-MCNC: 1.2 MG/DL (ref 0.55–1.3)
DACRYOCYTES BLD QL SMEAR: (no result)
DEPRECATED RDW RBC AUTO: 20.8 % (ref 11.9–15.9)
EOSINOPHIL # BLD: 1.3 % (ref 0–4.5)
GLUCOSE SERPL-MCNC: 86 MG/DL (ref 74–106)
HCT VFR BLD CALC: 25.5 % (ref 35.4–49)
HGB BLD-MCNC: 8.6 GM/DL (ref 11.7–16.9)
LYMPHOCYTES # BLD: 7.5 % (ref 8–40)
MACROCYTES BLD QL: (no result)
MCH RBC QN AUTO: 34.2 PG (ref 25.7–33.7)
MCHC RBC AUTO-ENTMCNC: 33.6 G/DL (ref 32–35.9)
MCV RBC: 101.7 FL (ref 80–96)
MONOCYTES # BLD AUTO: 10.3 % (ref 3.8–10.2)
NEUTROPHILS # BLD: 80.5 % (ref 42.8–82.8)
PLATELET # BLD AUTO: 63 10^3/UL (ref 134–434)
PLATELET BLD QL SMEAR: (no result)
PMV BLD: 8.3 FL (ref 7.5–11.1)
PROT SERPL-MCNC: 5.2 G/DL (ref 6.4–8.2)
RBC # BLD AUTO: 2.51 M/MM3 (ref 4–5.6)
SODIUM SERPL-SCNC: 145 MMOL/L (ref 136–145)
WBC # BLD AUTO: 5.4 K/MM3 (ref 4–10)

## 2021-12-16 PROCEDURE — 3E033GC INTRODUCTION OF OTHER THERAPEUTIC SUBSTANCE INTO PERIPHERAL VEIN, PERCUTANEOUS APPROACH: ICD-10-PCS | Performed by: INTERNAL MEDICINE

## 2022-01-13 ENCOUNTER — HOSPITAL ENCOUNTER (OUTPATIENT)
Dept: HOSPITAL 74 - JONCCHEMO | Age: 82
Discharge: HOME | End: 2022-01-13
Attending: INTERNAL MEDICINE
Payer: COMMERCIAL

## 2022-01-13 VITALS — SYSTOLIC BLOOD PRESSURE: 155 MMHG | DIASTOLIC BLOOD PRESSURE: 46 MMHG | HEART RATE: 69 BPM

## 2022-01-13 VITALS — TEMPERATURE: 97.9 F

## 2022-01-13 DIAGNOSIS — C90.00: ICD-10-CM

## 2022-01-13 DIAGNOSIS — Z51.11: Primary | ICD-10-CM

## 2022-01-13 LAB
ALBUMIN SERPL-MCNC: 3.4 G/DL (ref 3.4–5)
ALP SERPL-CCNC: 97 U/L (ref 45–117)
ALT SERPL-CCNC: 7 U/L (ref 13–61)
ANION GAP SERPL CALC-SCNC: 7 MMOL/L (ref 8–16)
AST SERPL-CCNC: 5 U/L (ref 15–37)
BASOPHILS # BLD: 0.7 % (ref 0–2)
BILIRUB SERPL-MCNC: 0.5 MG/DL (ref 0.2–1)
BUN SERPL-MCNC: 20.6 MG/DL (ref 7–18)
CALCIUM SERPL-MCNC: 8.7 MG/DL (ref 8.5–10.1)
CHLORIDE SERPL-SCNC: 112 MMOL/L (ref 98–107)
CO2 SERPL-SCNC: 22 MMOL/L (ref 21–32)
CREAT SERPL-MCNC: 1.4 MG/DL (ref 0.55–1.3)
DEPRECATED RDW RBC AUTO: 17.5 % (ref 11.9–15.9)
EOSINOPHIL # BLD: 1.5 % (ref 0–4.5)
GLUCOSE SERPL-MCNC: 104 MG/DL (ref 74–106)
HCT VFR BLD CALC: 28.4 % (ref 35.4–49)
HGB BLD-MCNC: 9.1 GM/DL (ref 11.7–16.9)
LYMPHOCYTES # BLD: 12.6 % (ref 8–40)
MCH RBC QN AUTO: 33.6 PG (ref 25.7–33.7)
MCHC RBC AUTO-ENTMCNC: 32.1 G/DL (ref 32–35.9)
MCV RBC: 104.7 FL (ref 80–96)
MONOCYTES # BLD AUTO: 5.8 % (ref 3.8–10.2)
NEUTROPHILS # BLD: 79.4 % (ref 42.8–82.8)
PLATELET # BLD AUTO: 75 10^3/UL (ref 134–434)
PMV BLD: 7.7 FL (ref 7.5–11.1)
PROT SERPL-MCNC: 5.6 G/DL (ref 6.4–8.2)
RBC # BLD AUTO: 2.71 M/MM3 (ref 4–5.6)
SODIUM SERPL-SCNC: 142 MMOL/L (ref 136–145)
WBC # BLD AUTO: 3.6 K/MM3 (ref 4–10)

## 2022-01-13 PROCEDURE — 3E033GC INTRODUCTION OF OTHER THERAPEUTIC SUBSTANCE INTO PERIPHERAL VEIN, PERCUTANEOUS APPROACH: ICD-10-PCS | Performed by: INTERNAL MEDICINE

## 2022-01-13 PROCEDURE — 3E01305 INTRODUCTION OF OTHER ANTINEOPLASTIC INTO SUBCUTANEOUS TISSUE, PERCUTANEOUS APPROACH: ICD-10-PCS | Performed by: INTERNAL MEDICINE

## 2022-02-10 ENCOUNTER — HOSPITAL ENCOUNTER (OUTPATIENT)
Dept: HOSPITAL 74 - JONCCHEMO | Age: 82
Discharge: HOME | End: 2022-02-10
Attending: INTERNAL MEDICINE
Payer: COMMERCIAL

## 2022-02-10 VITALS — DIASTOLIC BLOOD PRESSURE: 50 MMHG | SYSTOLIC BLOOD PRESSURE: 131 MMHG | HEART RATE: 77 BPM

## 2022-02-10 VITALS — TEMPERATURE: 98.5 F

## 2022-02-10 DIAGNOSIS — C90.00: ICD-10-CM

## 2022-02-10 DIAGNOSIS — Z51.11: Primary | ICD-10-CM

## 2022-02-10 DIAGNOSIS — J44.9: ICD-10-CM

## 2022-02-10 DIAGNOSIS — I10: ICD-10-CM

## 2022-02-10 LAB
ALBUMIN SERPL-MCNC: 3.6 G/DL (ref 3.4–5)
ALP SERPL-CCNC: 88 U/L (ref 45–117)
ALT SERPL-CCNC: 9 U/L (ref 13–61)
ANION GAP SERPL CALC-SCNC: 6 MMOL/L (ref 8–16)
AST SERPL-CCNC: 8 U/L (ref 15–37)
BASOPHILS # BLD: 0.6 % (ref 0–2)
BILIRUB SERPL-MCNC: 0.5 MG/DL (ref 0.2–1)
BUN SERPL-MCNC: 24 MG/DL (ref 7–18)
CALCIUM SERPL-MCNC: 8.7 MG/DL (ref 8.5–10.1)
CHLORIDE SERPL-SCNC: 112 MMOL/L (ref 98–107)
CO2 SERPL-SCNC: 24 MMOL/L (ref 21–32)
CREAT SERPL-MCNC: 1.4 MG/DL (ref 0.55–1.3)
DEPRECATED RDW RBC AUTO: 15.9 % (ref 11.9–15.9)
EOSINOPHIL # BLD: 2.4 % (ref 0–4.5)
GLUCOSE SERPL-MCNC: 121 MG/DL (ref 74–106)
HCT VFR BLD CALC: 29.8 % (ref 35.4–49)
HGB BLD-MCNC: 10.2 GM/DL (ref 11.7–16.9)
LYMPHOCYTES # BLD: 8.7 % (ref 8–40)
MCH RBC QN AUTO: 35.4 PG (ref 25.7–33.7)
MCHC RBC AUTO-ENTMCNC: 34.4 G/DL (ref 32–35.9)
MCV RBC: 102.7 FL (ref 80–96)
MONOCYTES # BLD AUTO: 7.8 % (ref 3.8–10.2)
NEUTROPHILS # BLD: 80.5 % (ref 42.8–82.8)
PLATELET # BLD AUTO: 72 10^3/UL (ref 134–434)
PMV BLD: 7.7 FL (ref 7.5–11.1)
PROT SERPL-MCNC: 5.9 G/DL (ref 6.4–8.2)
RBC # BLD AUTO: 2.9 M/MM3 (ref 4–5.6)
SODIUM SERPL-SCNC: 142 MMOL/L (ref 136–145)
WBC # BLD AUTO: 4.5 K/MM3 (ref 4–10)

## 2022-02-10 PROCEDURE — 3E033GC INTRODUCTION OF OTHER THERAPEUTIC SUBSTANCE INTO PERIPHERAL VEIN, PERCUTANEOUS APPROACH: ICD-10-PCS | Performed by: INTERNAL MEDICINE

## 2022-02-10 PROCEDURE — 3E01305 INTRODUCTION OF OTHER ANTINEOPLASTIC INTO SUBCUTANEOUS TISSUE, PERCUTANEOUS APPROACH: ICD-10-PCS | Performed by: INTERNAL MEDICINE

## 2022-03-10 ENCOUNTER — HOSPITAL ENCOUNTER (OUTPATIENT)
Dept: HOSPITAL 74 - JONCCHEMO | Age: 82
Discharge: HOME | End: 2022-03-10
Attending: INTERNAL MEDICINE
Payer: COMMERCIAL

## 2022-03-10 VITALS — TEMPERATURE: 98.3 F | HEART RATE: 93 BPM | SYSTOLIC BLOOD PRESSURE: 128 MMHG | DIASTOLIC BLOOD PRESSURE: 56 MMHG

## 2022-03-10 DIAGNOSIS — Z51.11: Primary | ICD-10-CM

## 2022-03-10 DIAGNOSIS — C90.00: ICD-10-CM

## 2022-03-10 LAB
ALBUMIN SERPL-MCNC: 3.3 G/DL (ref 3.4–5)
ALP SERPL-CCNC: 107 U/L (ref 45–117)
ALT SERPL-CCNC: 6 U/L (ref 13–61)
ANION GAP SERPL CALC-SCNC: 7 MMOL/L (ref 8–16)
AST SERPL-CCNC: 9 U/L (ref 15–37)
BASOPHILS # BLD: 0.5 % (ref 0–2)
BILIRUB SERPL-MCNC: 0.5 MG/DL (ref 0.2–1)
BUN SERPL-MCNC: 20.9 MG/DL (ref 7–18)
CALCIUM SERPL-MCNC: 8.8 MG/DL (ref 8.5–10.1)
CHLORIDE SERPL-SCNC: 110 MMOL/L (ref 98–107)
CO2 SERPL-SCNC: 24 MMOL/L (ref 21–32)
CREAT SERPL-MCNC: 1.3 MG/DL (ref 0.55–1.3)
DEPRECATED RDW RBC AUTO: 14.6 % (ref 11.9–15.9)
EOSINOPHIL # BLD: 3.5 % (ref 0–4.5)
GLUCOSE SERPL-MCNC: 83 MG/DL (ref 74–106)
HCT VFR BLD CALC: 33 % (ref 35.4–49)
HGB BLD-MCNC: 11 GM/DL (ref 11.7–16.9)
LYMPHOCYTES # BLD: 6.1 % (ref 8–40)
MCH RBC QN AUTO: 33.5 PG (ref 25.7–33.7)
MCHC RBC AUTO-ENTMCNC: 33.3 G/DL (ref 32–35.9)
MCV RBC: 100.5 FL (ref 80–96)
MONOCYTES # BLD AUTO: 10.8 % (ref 3.8–10.2)
NEUTROPHILS # BLD: 79.1 % (ref 42.8–82.8)
PLATELET # BLD AUTO: 111 10^3/UL (ref 134–434)
PMV BLD: 9.6 FL (ref 7.5–11.1)
PROT SERPL-MCNC: 5.8 G/DL (ref 6.4–8.2)
RBC # BLD AUTO: 3.28 M/MM3 (ref 4–5.6)
SODIUM SERPL-SCNC: 140 MMOL/L (ref 136–145)
WBC # BLD AUTO: 6.6 K/MM3 (ref 4–10)

## 2022-03-10 PROCEDURE — 3E01305 INTRODUCTION OF OTHER ANTINEOPLASTIC INTO SUBCUTANEOUS TISSUE, PERCUTANEOUS APPROACH: ICD-10-PCS | Performed by: INTERNAL MEDICINE

## 2022-03-10 PROCEDURE — 3E033GC INTRODUCTION OF OTHER THERAPEUTIC SUBSTANCE INTO PERIPHERAL VEIN, PERCUTANEOUS APPROACH: ICD-10-PCS | Performed by: INTERNAL MEDICINE

## 2022-04-07 ENCOUNTER — HOSPITAL ENCOUNTER (OUTPATIENT)
Dept: HOSPITAL 74 - JONCCHEMO | Age: 82
Discharge: HOME | End: 2022-04-07
Attending: INTERNAL MEDICINE
Payer: COMMERCIAL

## 2022-04-07 VITALS — DIASTOLIC BLOOD PRESSURE: 59 MMHG | HEART RATE: 80 BPM | SYSTOLIC BLOOD PRESSURE: 150 MMHG

## 2022-04-07 VITALS — TEMPERATURE: 98.6 F

## 2022-04-07 DIAGNOSIS — Z51.11: Primary | ICD-10-CM

## 2022-04-07 DIAGNOSIS — C90.00: ICD-10-CM

## 2022-04-07 LAB
ALBUMIN SERPL-MCNC: 3.5 G/DL (ref 3.4–5)
ALP SERPL-CCNC: 99 U/L (ref 45–117)
ALT SERPL-CCNC: 10 U/L (ref 13–61)
ANION GAP SERPL CALC-SCNC: 7 MMOL/L (ref 8–16)
AST SERPL-CCNC: 10 U/L (ref 15–37)
BASOPHILS # BLD: 0.6 % (ref 0–2)
BILIRUB SERPL-MCNC: 0.6 MG/DL (ref 0.2–1)
BUN SERPL-MCNC: 21.8 MG/DL (ref 7–18)
CALCIUM SERPL-MCNC: 9.1 MG/DL (ref 8.5–10.1)
CHLORIDE SERPL-SCNC: 110 MMOL/L (ref 98–107)
CO2 SERPL-SCNC: 24 MMOL/L (ref 21–32)
CREAT SERPL-MCNC: 1.3 MG/DL (ref 0.55–1.3)
DEPRECATED RDW RBC AUTO: 15.9 % (ref 11.9–15.9)
EOSINOPHIL # BLD: 3.3 % (ref 0–4.5)
GLUCOSE SERPL-MCNC: 102 MG/DL (ref 74–106)
HCT VFR BLD CALC: 32 % (ref 35.4–49)
HGB BLD-MCNC: 10.6 GM/DL (ref 11.7–16.9)
LYMPHOCYTES # BLD: 8 % (ref 8–40)
MCH RBC QN AUTO: 33.3 PG (ref 25.7–33.7)
MCHC RBC AUTO-ENTMCNC: 33.2 G/DL (ref 32–35.9)
MCV RBC: 100.1 FL (ref 80–96)
MONOCYTES # BLD AUTO: 8.9 % (ref 3.8–10.2)
NEUTROPHILS # BLD: 79.2 % (ref 42.8–82.8)
PLATELET # BLD AUTO: 99 10^3/UL (ref 134–434)
PMV BLD: 8.8 FL (ref 7.5–11.1)
PROT SERPL-MCNC: 5.7 G/DL (ref 6.4–8.2)
RBC # BLD AUTO: 3.2 M/MM3 (ref 4–5.6)
SODIUM SERPL-SCNC: 141 MMOL/L (ref 136–145)
WBC # BLD AUTO: 5.3 K/MM3 (ref 4–10)

## 2022-04-07 PROCEDURE — 3E033GC INTRODUCTION OF OTHER THERAPEUTIC SUBSTANCE INTO PERIPHERAL VEIN, PERCUTANEOUS APPROACH: ICD-10-PCS | Performed by: INTERNAL MEDICINE

## 2022-04-07 PROCEDURE — 3E01305 INTRODUCTION OF OTHER ANTINEOPLASTIC INTO SUBCUTANEOUS TISSUE, PERCUTANEOUS APPROACH: ICD-10-PCS | Performed by: INTERNAL MEDICINE

## 2022-05-05 ENCOUNTER — HOSPITAL ENCOUNTER (OUTPATIENT)
Dept: HOSPITAL 74 - JONCCHEMO | Age: 82
Discharge: HOME | End: 2022-05-05
Attending: INTERNAL MEDICINE
Payer: COMMERCIAL

## 2022-05-05 VITALS — HEART RATE: 78 BPM

## 2022-05-05 VITALS — TEMPERATURE: 98 F | SYSTOLIC BLOOD PRESSURE: 132 MMHG | DIASTOLIC BLOOD PRESSURE: 84 MMHG

## 2022-05-05 DIAGNOSIS — C90.00: ICD-10-CM

## 2022-05-05 DIAGNOSIS — Z51.11: Primary | ICD-10-CM

## 2022-05-05 LAB
ALBUMIN SERPL-MCNC: 3.1 G/DL (ref 3.4–5)
ALP SERPL-CCNC: 116 U/L (ref 45–117)
ALT SERPL-CCNC: 8 U/L (ref 13–61)
ANION GAP SERPL CALC-SCNC: 9 MMOL/L (ref 8–16)
AST SERPL-CCNC: 12 U/L (ref 15–37)
BASOPHILS # BLD: 0.4 % (ref 0–2)
BILIRUB SERPL-MCNC: 0.9 MG/DL (ref 0.2–1)
BUN SERPL-MCNC: 19.1 MG/DL (ref 7–18)
CALCIUM SERPL-MCNC: 8.8 MG/DL (ref 8.5–10.1)
CHLORIDE SERPL-SCNC: 111 MMOL/L (ref 98–107)
CO2 SERPL-SCNC: 24 MMOL/L (ref 21–32)
CREAT SERPL-MCNC: 1.3 MG/DL (ref 0.55–1.3)
DEPRECATED RDW RBC AUTO: 16.1 % (ref 11.9–15.9)
EOSINOPHIL # BLD: 2.5 % (ref 0–4.5)
GLUCOSE SERPL-MCNC: 93 MG/DL (ref 74–106)
HCT VFR BLD CALC: 28.9 % (ref 35.4–49)
HGB BLD-MCNC: 9.9 GM/DL (ref 11.7–16.9)
LYMPHOCYTES # BLD: 9.3 % (ref 8–40)
MCH RBC QN AUTO: 33.8 PG (ref 25.7–33.7)
MCHC RBC AUTO-ENTMCNC: 34.1 G/DL (ref 32–35.9)
MCV RBC: 99.2 FL (ref 80–96)
MONOCYTES # BLD AUTO: 7.6 % (ref 3.8–10.2)
NEUTROPHILS # BLD: 80.2 % (ref 42.8–82.8)
PLATELET # BLD AUTO: 79 10^3/UL (ref 134–434)
PMV BLD: 8.2 FL (ref 7.5–11.1)
PROT SERPL-MCNC: 5.2 G/DL (ref 6.4–8.2)
RBC # BLD AUTO: 2.91 M/MM3 (ref 4–5.6)
SODIUM SERPL-SCNC: 143 MMOL/L (ref 136–145)
WBC # BLD AUTO: 5.8 K/MM3 (ref 4–10)

## 2022-05-05 PROCEDURE — 3E01305 INTRODUCTION OF OTHER ANTINEOPLASTIC INTO SUBCUTANEOUS TISSUE, PERCUTANEOUS APPROACH: ICD-10-PCS | Performed by: INTERNAL MEDICINE

## 2022-05-05 PROCEDURE — 3E043GC INTRODUCTION OF OTHER THERAPEUTIC SUBSTANCE INTO CENTRAL VEIN, PERCUTANEOUS APPROACH: ICD-10-PCS | Performed by: INTERNAL MEDICINE

## 2022-06-02 ENCOUNTER — HOSPITAL ENCOUNTER (OUTPATIENT)
Dept: HOSPITAL 74 - JONCCHEMO | Age: 82
Discharge: HOME | End: 2022-06-02
Attending: INTERNAL MEDICINE
Payer: COMMERCIAL

## 2022-06-02 DIAGNOSIS — Z53.8: Primary | ICD-10-CM

## 2022-06-02 LAB
ALBUMIN SERPL-MCNC: 3.2 G/DL (ref 3.4–5)
ALP SERPL-CCNC: 116 U/L (ref 45–117)
ALT SERPL-CCNC: 7 U/L (ref 13–61)
ANION GAP SERPL CALC-SCNC: 6 MMOL/L (ref 8–16)
AST SERPL-CCNC: 10 U/L (ref 15–37)
BASOPHILS # BLD: 0.7 % (ref 0–2)
BILIRUB SERPL-MCNC: 0.5 MG/DL (ref 0.2–1)
BUN SERPL-MCNC: 20.3 MG/DL (ref 7–18)
CALCIUM SERPL-MCNC: 8.6 MG/DL (ref 8.5–10.1)
CHLORIDE SERPL-SCNC: 111 MMOL/L (ref 98–107)
CO2 SERPL-SCNC: 24 MMOL/L (ref 21–32)
CREAT SERPL-MCNC: 1.3 MG/DL (ref 0.55–1.3)
DEPRECATED RDW RBC AUTO: 16.4 % (ref 11.9–15.9)
EOSINOPHIL # BLD: 2.3 % (ref 0–4.5)
GLUCOSE SERPL-MCNC: 117 MG/DL (ref 74–106)
HCT VFR BLD CALC: 29.2 % (ref 35.4–49)
HGB BLD-MCNC: 9.9 GM/DL (ref 11.7–16.9)
LYMPHOCYTES # BLD: 6.9 % (ref 8–40)
MCH RBC QN AUTO: 33.8 PG (ref 25.7–33.7)
MCHC RBC AUTO-ENTMCNC: 33.9 G/DL (ref 32–35.9)
MCV RBC: 99.8 FL (ref 80–96)
MONOCYTES # BLD AUTO: 7.5 % (ref 3.8–10.2)
NEUTROPHILS # BLD: 82.6 % (ref 42.8–82.8)
PLATELET # BLD AUTO: 62 10^3/UL (ref 134–434)
PMV BLD: 7.5 FL (ref 7.5–11.1)
PROT SERPL-MCNC: 5.6 G/DL (ref 6.4–8.2)
RBC # BLD AUTO: 2.93 M/MM3 (ref 4–5.6)
SODIUM SERPL-SCNC: 141 MMOL/L (ref 136–145)
WBC # BLD AUTO: 5 K/MM3 (ref 4–10)

## 2022-06-08 ENCOUNTER — HOSPITAL ENCOUNTER (OUTPATIENT)
Dept: HOSPITAL 74 - JONCCHEMO | Age: 82
Discharge: HOME | End: 2022-06-08
Attending: INTERNAL MEDICINE
Payer: COMMERCIAL

## 2022-06-08 VITALS — DIASTOLIC BLOOD PRESSURE: 70 MMHG | HEART RATE: 80 BPM | SYSTOLIC BLOOD PRESSURE: 139 MMHG

## 2022-06-08 VITALS — TEMPERATURE: 98.5 F

## 2022-06-08 DIAGNOSIS — Z51.11: Primary | ICD-10-CM

## 2022-06-08 DIAGNOSIS — C90.00: ICD-10-CM

## 2022-06-08 LAB
ALBUMIN SERPL-MCNC: 3.5 G/DL (ref 3.4–5)
ALP SERPL-CCNC: 118 U/L (ref 45–117)
ALT SERPL-CCNC: 8 U/L (ref 13–61)
ANION GAP SERPL CALC-SCNC: 5 MMOL/L (ref 8–16)
AST SERPL-CCNC: 8 U/L (ref 15–37)
BASOPHILS # BLD: 0.7 % (ref 0–2)
BILIRUB SERPL-MCNC: 0.5 MG/DL (ref 0.2–1)
BUN SERPL-MCNC: 23.6 MG/DL (ref 7–18)
CALCIUM SERPL-MCNC: 8.6 MG/DL (ref 8.5–10.1)
CHLORIDE SERPL-SCNC: 112 MMOL/L (ref 98–107)
CO2 SERPL-SCNC: 24 MMOL/L (ref 21–32)
CREAT SERPL-MCNC: 1.4 MG/DL (ref 0.55–1.3)
DEPRECATED RDW RBC AUTO: 16.5 % (ref 11.9–15.9)
EOSINOPHIL # BLD: 0.9 % (ref 0–4.5)
GLUCOSE SERPL-MCNC: 113 MG/DL (ref 74–106)
HCT VFR BLD CALC: 29.9 % (ref 35.4–49)
HGB BLD-MCNC: 10 GM/DL (ref 11.7–16.9)
LYMPHOCYTES # BLD: 8.5 % (ref 8–40)
MCH RBC QN AUTO: 33 PG (ref 25.7–33.7)
MCHC RBC AUTO-ENTMCNC: 33.4 G/DL (ref 32–35.9)
MCV RBC: 99 FL (ref 80–96)
MONOCYTES # BLD AUTO: 7.6 % (ref 3.8–10.2)
NEUTROPHILS # BLD: 82.3 % (ref 42.8–82.8)
PLATELET # BLD AUTO: 61 10^3/UL (ref 134–434)
PMV BLD: 7.7 FL (ref 7.5–11.1)
PROT SERPL-MCNC: 5.8 G/DL (ref 6.4–8.2)
RBC # BLD AUTO: 3.02 M/MM3 (ref 4–5.6)
SODIUM SERPL-SCNC: 141 MMOL/L (ref 136–145)
WBC # BLD AUTO: 4.6 K/MM3 (ref 4–10)

## 2022-06-08 PROCEDURE — 3E01305 INTRODUCTION OF OTHER ANTINEOPLASTIC INTO SUBCUTANEOUS TISSUE, PERCUTANEOUS APPROACH: ICD-10-PCS | Performed by: INTERNAL MEDICINE

## 2022-06-08 PROCEDURE — 3E033GC INTRODUCTION OF OTHER THERAPEUTIC SUBSTANCE INTO PERIPHERAL VEIN, PERCUTANEOUS APPROACH: ICD-10-PCS | Performed by: INTERNAL MEDICINE

## 2022-07-27 ENCOUNTER — HOSPITAL ENCOUNTER (OUTPATIENT)
Dept: HOSPITAL 74 - JONCCHEMO | Age: 82
Discharge: HOME | End: 2022-07-27
Attending: INTERNAL MEDICINE
Payer: COMMERCIAL

## 2022-07-27 DIAGNOSIS — Z53.8: Primary | ICD-10-CM

## 2022-07-27 LAB
ALBUMIN SERPL-MCNC: 3.1 G/DL (ref 3.4–5)
ALP SERPL-CCNC: 119 U/L (ref 45–117)
ALT SERPL-CCNC: 6 U/L (ref 13–61)
ANION GAP SERPL CALC-SCNC: 8 MMOL/L (ref 8–16)
AST SERPL-CCNC: 7 U/L (ref 15–37)
BASOPHILS # BLD: 0.3 % (ref 0–2)
BILIRUB SERPL-MCNC: 0.7 MG/DL (ref 0.2–1)
BUN SERPL-MCNC: 21.2 MG/DL (ref 7–18)
CALCIUM SERPL-MCNC: 8.3 MG/DL (ref 8.5–10.1)
CHLORIDE SERPL-SCNC: 110 MMOL/L (ref 98–107)
CO2 SERPL-SCNC: 23 MMOL/L (ref 21–32)
CREAT SERPL-MCNC: 1.4 MG/DL (ref 0.55–1.3)
DEPRECATED RDW RBC AUTO: 15.3 % (ref 11.9–15.9)
EOSINOPHIL # BLD: 1.1 % (ref 0–4.5)
GLUCOSE SERPL-MCNC: 96 MG/DL (ref 74–106)
HCT VFR BLD CALC: 24 % (ref 35.4–49)
HGB BLD-MCNC: 8.1 GM/DL (ref 11.7–16.9)
LYMPHOCYTES # BLD: 18.9 % (ref 8–40)
MCH RBC QN AUTO: 33 PG (ref 25.7–33.7)
MCHC RBC AUTO-ENTMCNC: 33.8 G/DL (ref 32–35.9)
MCV RBC: 97.5 FL (ref 80–96)
MONOCYTES # BLD AUTO: 8.2 % (ref 3.8–10.2)
NEUTROPHILS # BLD: 71.5 % (ref 42.8–82.8)
PLATELET # BLD AUTO: 44 10^3/UL (ref 134–434)
PMV BLD: 8.1 FL (ref 7.5–11.1)
PROT SERPL-MCNC: 5.4 G/DL (ref 6.4–8.2)
RBC # BLD AUTO: 2.47 M/MM3 (ref 4–5.6)
SODIUM SERPL-SCNC: 141 MMOL/L (ref 136–145)
WBC # BLD AUTO: 2.3 K/MM3 (ref 4–10)

## 2022-07-28 LAB — KAPPA LC FREE SER-MCNC: 19.2 MG/L (ref 3.3–19.4)

## 2022-08-08 ENCOUNTER — HOSPITAL ENCOUNTER (INPATIENT)
Dept: HOSPITAL 74 - JER | Age: 82
LOS: 9 days | Discharge: NURSING, REHAB OR LONG-TERM CARE | DRG: 809 | End: 2022-08-17
Attending: INTERNAL MEDICINE
Payer: COMMERCIAL

## 2022-08-08 VITALS — BODY MASS INDEX: 22.8 KG/M2

## 2022-08-08 DIAGNOSIS — G20: ICD-10-CM

## 2022-08-08 DIAGNOSIS — I10: ICD-10-CM

## 2022-08-08 DIAGNOSIS — C90.00: ICD-10-CM

## 2022-08-08 DIAGNOSIS — D61.818: Primary | ICD-10-CM

## 2022-08-08 DIAGNOSIS — D69.6: ICD-10-CM

## 2022-08-08 DIAGNOSIS — D64.9: ICD-10-CM

## 2022-08-08 DIAGNOSIS — N18.9: ICD-10-CM

## 2022-08-08 DIAGNOSIS — K21.9: ICD-10-CM

## 2022-08-08 LAB
ALBUMIN SERPL-MCNC: 3.1 G/DL (ref 3.4–5)
ALP SERPL-CCNC: 114 U/L (ref 45–117)
ALT SERPL-CCNC: 8 U/L (ref 13–61)
ANION GAP SERPL CALC-SCNC: 11 MMOL/L (ref 8–16)
ANISOCYTOSIS BLD QL: 0
APTT BLD: 27 SECONDS (ref 25.2–36.5)
AST SERPL-CCNC: 8 U/L (ref 15–37)
BASOPHILS # BLD: 0.3 % (ref 0–2)
BILIRUB DIRECT SERPL-MCNC: 198 U/L (ref 87–246)
BILIRUB SERPL-MCNC: 0.7 MG/DL (ref 0.2–1)
BUN SERPL-MCNC: 23.4 MG/DL (ref 7–18)
CALCIUM SERPL-MCNC: 8.6 MG/DL (ref 8.5–10.1)
CHLORIDE SERPL-SCNC: 108 MMOL/L (ref 98–107)
CO2 SERPL-SCNC: 20 MMOL/L (ref 21–32)
CREAT SERPL-MCNC: 1.6 MG/DL (ref 0.55–1.3)
DEPRECATED RDW RBC AUTO: 15.6 % (ref 11.9–15.9)
DEPRECATED RDW RBC AUTO: 15.7 % (ref 11.9–15.9)
EOSINOPHIL # BLD: 0.4 % (ref 0–4.5)
GLUCOSE SERPL-MCNC: 103 MG/DL (ref 74–106)
HCT VFR BLD CALC: 22.1 % (ref 35.4–49)
HCT VFR BLD CALC: 22.6 % (ref 35.4–49)
HGB BLD-MCNC: 7.4 GM/DL (ref 11.7–16.9)
HGB BLD-MCNC: 7.5 GM/DL (ref 11.7–16.9)
INR BLD: 1.1 (ref 0.83–1.09)
IRON SERPL-MCNC: 34 UG/DL (ref 50–175)
LYMPHOCYTES # BLD: 18 % (ref 8–40)
MACROCYTES BLD QL: 0
MAGNESIUM SERPL-MCNC: 2.6 MG/DL (ref 1.8–2.4)
MCH RBC QN AUTO: 32.3 PG (ref 25.7–33.7)
MCH RBC QN AUTO: 32.4 PG (ref 25.7–33.7)
MCHC RBC AUTO-ENTMCNC: 33.2 G/DL (ref 32–35.9)
MCHC RBC AUTO-ENTMCNC: 33.4 G/DL (ref 32–35.9)
MCV RBC: 97.1 FL (ref 80–96)
MCV RBC: 97.3 FL (ref 80–96)
MONOCYTES # BLD AUTO: 7.8 % (ref 3.8–10.2)
NEUTROPHILS # BLD: 73.5 % (ref 42.8–82.8)
PLATELET # BLD AUTO: 40 10^3/UL (ref 134–434)
PLATELET # BLD AUTO: 41 10^3/UL (ref 134–434)
PMV BLD: 8.4 FL (ref 7.5–11.1)
PMV BLD: 8.5 FL (ref 7.5–11.1)
PROT SERPL-MCNC: 5.3 G/DL (ref 6.4–8.2)
PT PNL PPP: 12.7 SEC (ref 9.7–13)
RBC # BLD AUTO: 2.27 M/MM3 (ref 4–5.6)
RBC # BLD AUTO: 2.32 M/MM3 (ref 4–5.6)
RETICS # AUTO: 3.88 % (ref 0.5–1.5)
SODIUM SERPL-SCNC: 139 MMOL/L (ref 136–145)
TIBC SERPL-MCNC: 243 UG/DL (ref 250–450)
WBC # BLD AUTO: 2.2 K/MM3 (ref 4–10)
WBC # BLD AUTO: 2.3 K/MM3 (ref 4–10)

## 2022-08-08 PROCEDURE — P9058 RBC, L/R, CMV-NEG, IRRAD: HCPCS

## 2022-08-08 PROCEDURE — U0003 INFECTIOUS AGENT DETECTION BY NUCLEIC ACID (DNA OR RNA); SEVERE ACUTE RESPIRATORY SYNDROME CORONAVIRUS 2 (SARS-COV-2) (CORONAVIRUS DISEASE [COVID-19]), AMPLIFIED PROBE TECHNIQUE, MAKING USE OF HIGH THROUGHPUT TECHNOLOGIES AS DESCRIBED BY CMS-2020-01-R: HCPCS

## 2022-08-08 PROCEDURE — P9034 PLATELETS, PHERESIS: HCPCS

## 2022-08-08 PROCEDURE — U0005 INFEC AGEN DETEC AMPLI PROBE: HCPCS

## 2022-08-09 LAB
ALBUMIN SERPL-MCNC: 3.1 G/DL (ref 3.4–5)
ALP SERPL-CCNC: 111 U/L (ref 45–117)
ALT SERPL-CCNC: 7 U/L (ref 13–61)
ANION GAP SERPL CALC-SCNC: 8 MMOL/L (ref 8–16)
APTT BLD: 28 SECONDS (ref 25.2–36.5)
APTT BLD: 28.2 SECONDS (ref 25.2–36.5)
AST SERPL-CCNC: 4 U/L (ref 15–37)
BASOPHILS # BLD: 0.6 % (ref 0–2)
BILIRUB CONJ SERPL-MCNC: 0.3 MG/DL (ref 0–0.2)
BILIRUB DIRECT SERPL-MCNC: 196 U/L (ref 87–246)
BILIRUB SERPL-MCNC: 1.3 MG/DL (ref 0.2–1)
BUN SERPL-MCNC: 23.6 MG/DL (ref 7–18)
CALCIUM SERPL-MCNC: 8.7 MG/DL (ref 8.5–10.1)
CHLORIDE SERPL-SCNC: 109 MMOL/L (ref 98–107)
CO2 SERPL-SCNC: 24 MMOL/L (ref 21–32)
CREAT SERPL-MCNC: 1.5 MG/DL (ref 0.55–1.3)
DEPRECATED RDW RBC AUTO: 15.4 % (ref 11.9–15.9)
EOSINOPHIL # BLD: 0.5 % (ref 0–4.5)
GLUCOSE SERPL-MCNC: 104 MG/DL (ref 74–106)
HCT VFR BLD CALC: 21.9 % (ref 35.4–49)
HGB BLD-MCNC: 7.4 GM/DL (ref 11.7–16.9)
INR BLD: 1.15 (ref 0.83–1.09)
INR BLD: 1.17 (ref 0.83–1.09)
LYMPHOCYTES # BLD: 15 % (ref 8–40)
MAGNESIUM SERPL-MCNC: 2.6 MG/DL (ref 1.8–2.4)
MCH RBC QN AUTO: 32.6 PG (ref 25.7–33.7)
MCHC RBC AUTO-ENTMCNC: 34 G/DL (ref 32–35.9)
MCV RBC: 96.1 FL (ref 80–96)
MONOCYTES # BLD AUTO: 6.3 % (ref 3.8–10.2)
NEUTROPHILS # BLD: 77.6 % (ref 42.8–82.8)
PHOSPHATE SERPL-MCNC: 3 MG/DL (ref 2.5–4.9)
PLATELET # BLD AUTO: 41 10^3/UL (ref 134–434)
PMV BLD: 8.6 FL (ref 7.5–11.1)
PROT SERPL-MCNC: 5.4 G/DL (ref 6.4–8.2)
PT PNL PPP: 13.3 SEC (ref 9.7–13)
PT PNL PPP: 13.5 SEC (ref 9.7–13)
RBC # BLD AUTO: 2.28 M/MM3 (ref 4–5.6)
SODIUM SERPL-SCNC: 142 MMOL/L (ref 136–145)
WBC # BLD AUTO: 2.2 K/MM3 (ref 4–10)

## 2022-08-09 RX ADMIN — POLYETHYLENE GLYCOL 3350 SCH GM: 17 POWDER, FOR SOLUTION ORAL at 22:30

## 2022-08-09 RX ADMIN — PANTOPRAZOLE SODIUM SCH MG: 40 TABLET, DELAYED RELEASE ORAL at 09:53

## 2022-08-09 RX ADMIN — VALACYCLOVIR HYDROCHLORIDE SCH MG: 500 TABLET ORAL at 09:53

## 2022-08-09 RX ADMIN — POLYETHYLENE GLYCOL 3350 SCH GM: 17 POWDER, FOR SOLUTION ORAL at 13:34

## 2022-08-10 LAB
ALBUMIN SERPL-MCNC: 3.2 G/DL (ref 3.4–5)
ALP SERPL-CCNC: 116 U/L (ref 45–117)
ALT SERPL-CCNC: 6 U/L (ref 13–61)
ANION GAP SERPL CALC-SCNC: 9 MMOL/L (ref 8–16)
APTT BLD: 29.9 SECONDS (ref 25.2–36.5)
AST SERPL-CCNC: 9 U/L (ref 15–37)
BILIRUB SERPL-MCNC: 0.9 MG/DL (ref 0.2–1)
BUN SERPL-MCNC: 19.9 MG/DL (ref 7–18)
CALCIUM SERPL-MCNC: 8.7 MG/DL (ref 8.5–10.1)
CHLORIDE SERPL-SCNC: 110 MMOL/L (ref 98–107)
CO2 SERPL-SCNC: 24 MMOL/L (ref 21–32)
CREAT SERPL-MCNC: 1.4 MG/DL (ref 0.55–1.3)
DEPRECATED RDW RBC AUTO: 15.4 % (ref 11.9–15.9)
GLUCOSE SERPL-MCNC: 92 MG/DL (ref 74–106)
HCT VFR BLD CALC: 21.7 % (ref 35.4–49)
HGB BLD-MCNC: 7.3 GM/DL (ref 11.7–16.9)
INR BLD: 1.11 (ref 0.83–1.09)
IRON SERPL-MCNC: 42 UG/DL (ref 50–175)
MCH RBC QN AUTO: 32.5 PG (ref 25.7–33.7)
MCHC RBC AUTO-ENTMCNC: 33.8 G/DL (ref 32–35.9)
MCV RBC: 96.3 FL (ref 80–96)
PLATELET # BLD AUTO: 67 10^3/UL (ref 134–434)
PMV BLD: 7.6 FL (ref 7.5–11.1)
PROT SERPL-MCNC: 5.6 G/DL (ref 6.4–8.2)
PT PNL PPP: 12.8 SEC (ref 9.7–13)
RBC # BLD AUTO: 2.26 M/MM3 (ref 4–5.6)
SODIUM SERPL-SCNC: 143 MMOL/L (ref 136–145)
TIBC SERPL-MCNC: 246 UG/DL (ref 250–450)
WBC # BLD AUTO: 2 K/MM3 (ref 4–10)

## 2022-08-10 PROCEDURE — 30233R1 TRANSFUSION OF NONAUTOLOGOUS PLATELETS INTO PERIPHERAL VEIN, PERCUTANEOUS APPROACH: ICD-10-PCS | Performed by: INTERNAL MEDICINE

## 2022-08-10 PROCEDURE — 30233N1 TRANSFUSION OF NONAUTOLOGOUS RED BLOOD CELLS INTO PERIPHERAL VEIN, PERCUTANEOUS APPROACH: ICD-10-PCS | Performed by: INTERNAL MEDICINE

## 2022-08-10 RX ADMIN — POLYETHYLENE GLYCOL 3350 SCH: 17 POWDER, FOR SOLUTION ORAL at 07:06

## 2022-08-10 RX ADMIN — PANTOPRAZOLE SODIUM SCH MG: 40 TABLET, DELAYED RELEASE ORAL at 10:29

## 2022-08-10 RX ADMIN — POLYETHYLENE GLYCOL 3350 SCH GM: 17 POWDER, FOR SOLUTION ORAL at 15:38

## 2022-08-10 RX ADMIN — POLYETHYLENE GLYCOL 3350 SCH: 17 POWDER, FOR SOLUTION ORAL at 22:42

## 2022-08-10 RX ADMIN — VALACYCLOVIR HYDROCHLORIDE SCH MG: 500 TABLET ORAL at 10:29

## 2022-08-11 LAB
ALBUMIN SERPL-MCNC: 3.3 G/DL (ref 3.4–5)
ALP SERPL-CCNC: 110 U/L (ref 45–117)
ALT SERPL-CCNC: 8 U/L (ref 13–61)
ANION GAP SERPL CALC-SCNC: 7 MMOL/L (ref 8–16)
ANISOCYTOSIS BLD QL: (no result)
AST SERPL-CCNC: 11 U/L (ref 15–37)
BILIRUB SERPL-MCNC: 2 MG/DL (ref 0.2–1)
BUN SERPL-MCNC: 21.7 MG/DL (ref 7–18)
CALCIUM SERPL-MCNC: 8.6 MG/DL (ref 8.5–10.1)
CHLORIDE SERPL-SCNC: 109 MMOL/L (ref 98–107)
CO2 SERPL-SCNC: 27 MMOL/L (ref 21–32)
CREAT SERPL-MCNC: 1.3 MG/DL (ref 0.55–1.3)
DEPRECATED RDW RBC AUTO: 18.3 % (ref 11.9–15.9)
GLUCOSE SERPL-MCNC: 87 MG/DL (ref 74–106)
HCT VFR BLD CALC: 22.5 % (ref 35.4–49)
HGB BLD-MCNC: 7.7 GM/DL (ref 11.7–16.9)
INR BLD: 1.12 (ref 0.83–1.09)
MACROCYTES BLD QL: (no result)
MAGNESIUM SERPL-MCNC: 2.9 MG/DL (ref 1.8–2.4)
MCH RBC QN AUTO: 31.5 PG (ref 25.7–33.7)
MCHC RBC AUTO-ENTMCNC: 34.1 G/DL (ref 32–35.9)
MCV RBC: 92.4 FL (ref 80–96)
OVALOCYTES BLD QL SMEAR: (no result)
PHOSPHATE SERPL-MCNC: 3 MG/DL (ref 2.5–4.9)
PLATELET # BLD AUTO: 94 10^3/UL (ref 134–434)
PLATELET BLD QL SMEAR: (no result)
PMV BLD: 7.6 FL (ref 7.5–11.1)
PROT SERPL-MCNC: 5.7 G/DL (ref 6.4–8.2)
PT PNL PPP: 12.9 SEC (ref 9.7–13)
RBC # BLD AUTO: 2.44 M/MM3 (ref 4–5.6)
SODIUM SERPL-SCNC: 142 MMOL/L (ref 136–145)
WBC # BLD AUTO: 2.4 K/MM3 (ref 4–10)

## 2022-08-11 RX ADMIN — POLYETHYLENE GLYCOL 3350 SCH GM: 17 POWDER, FOR SOLUTION ORAL at 13:13

## 2022-08-11 RX ADMIN — POLYETHYLENE GLYCOL 3350 ONE: 17 POWDER, FOR SOLUTION ORAL at 15:08

## 2022-08-11 RX ADMIN — PANTOPRAZOLE SODIUM SCH MG: 40 TABLET, DELAYED RELEASE ORAL at 09:22

## 2022-08-11 RX ADMIN — POLYETHYLENE GLYCOL 3350 SCH: 17 POWDER, FOR SOLUTION ORAL at 22:01

## 2022-08-11 RX ADMIN — POLYETHYLENE GLYCOL 3350 ONE: 17 POWDER, FOR SOLUTION ORAL at 16:26

## 2022-08-11 RX ADMIN — POLYETHYLENE GLYCOL 3350 SCH: 17 POWDER, FOR SOLUTION ORAL at 06:01

## 2022-08-11 RX ADMIN — VALACYCLOVIR HYDROCHLORIDE SCH MG: 500 TABLET ORAL at 09:22

## 2022-08-12 LAB
ALBUMIN SERPL-MCNC: 2.9 G/DL (ref 3.4–5)
ALP SERPL-CCNC: 104 U/L (ref 45–117)
ALT SERPL-CCNC: < 6 U/L (ref 13–61)
ANION GAP SERPL CALC-SCNC: 6 MMOL/L (ref 8–16)
ANISOCYTOSIS BLD QL: (no result)
APTT BLD: 28.6 SECONDS (ref 25.2–36.5)
AST SERPL-CCNC: 8 U/L (ref 15–37)
BILIRUB SERPL-MCNC: 1 MG/DL (ref 0.2–1)
BUN SERPL-MCNC: 20.1 MG/DL (ref 7–18)
CALCIUM SERPL-MCNC: 7.9 MG/DL (ref 8.5–10.1)
CHLORIDE SERPL-SCNC: 111 MMOL/L (ref 98–107)
CO2 SERPL-SCNC: 26 MMOL/L (ref 21–32)
CREAT SERPL-MCNC: 1.3 MG/DL (ref 0.55–1.3)
DEPRECATED RDW RBC AUTO: 18.4 % (ref 11.9–15.9)
GLUCOSE SERPL-MCNC: 93 MG/DL (ref 74–106)
HCT VFR BLD CALC: 21.8 % (ref 35.4–49)
HGB BLD-MCNC: 7.5 GM/DL (ref 11.7–16.9)
INR BLD: 1.15 (ref 0.83–1.09)
MACROCYTES BLD QL: 0
MAGNESIUM SERPL-MCNC: 2.8 MG/DL (ref 1.8–2.4)
MCH RBC QN AUTO: 31.4 PG (ref 25.7–33.7)
MCHC RBC AUTO-ENTMCNC: 34.5 G/DL (ref 32–35.9)
MCV RBC: 90.9 FL (ref 80–96)
PHOSPHATE SERPL-MCNC: 3 MG/DL (ref 2.5–4.9)
PLATELET # BLD AUTO: 69 10^3/UL (ref 134–434)
PLATELET BLD QL SMEAR: (no result)
PMV BLD: 7.3 FL (ref 7.5–11.1)
PROT SERPL-MCNC: 5 G/DL (ref 6.4–8.2)
PT PNL PPP: 13.3 SEC (ref 9.7–13)
RBC # BLD AUTO: 2.4 M/MM3 (ref 4–5.6)
SODIUM SERPL-SCNC: 143 MMOL/L (ref 136–145)
WBC # BLD AUTO: 1.8 K/MM3 (ref 4–10)

## 2022-08-12 RX ADMIN — POLYETHYLENE GLYCOL 3350 SCH GM: 17 POWDER, FOR SOLUTION ORAL at 16:06

## 2022-08-12 RX ADMIN — POLYETHYLENE GLYCOL 3350 SCH GM: 17 POWDER, FOR SOLUTION ORAL at 21:45

## 2022-08-12 RX ADMIN — PANTOPRAZOLE SODIUM SCH MG: 40 TABLET, DELAYED RELEASE ORAL at 11:10

## 2022-08-12 RX ADMIN — VALACYCLOVIR HYDROCHLORIDE SCH MG: 500 TABLET ORAL at 11:10

## 2022-08-12 RX ADMIN — POLYETHYLENE GLYCOL 3350 SCH: 17 POWDER, FOR SOLUTION ORAL at 05:43

## 2022-08-13 LAB
ALBUMIN SERPL-MCNC: 3 G/DL (ref 3.4–5)
ALP SERPL-CCNC: 111 U/L (ref 45–117)
ALT SERPL-CCNC: < 6 U/L (ref 13–61)
ANION GAP SERPL CALC-SCNC: 8 MMOL/L (ref 8–16)
AST SERPL-CCNC: 11 U/L (ref 15–37)
BILIRUB SERPL-MCNC: 2.2 MG/DL (ref 0.2–1)
BUN SERPL-MCNC: 20.2 MG/DL (ref 7–18)
CALCIUM SERPL-MCNC: 8.4 MG/DL (ref 8.5–10.1)
CHLORIDE SERPL-SCNC: 109 MMOL/L (ref 98–107)
CO2 SERPL-SCNC: 26 MMOL/L (ref 21–32)
CREAT SERPL-MCNC: 1.3 MG/DL (ref 0.55–1.3)
DEPRECATED RDW RBC AUTO: 18 % (ref 11.9–15.9)
GLUCOSE SERPL-MCNC: 108 MG/DL (ref 74–106)
HCT VFR BLD CALC: 22.8 % (ref 35.4–49)
HGB BLD-MCNC: 7.7 GM/DL (ref 11.7–16.9)
MCH RBC QN AUTO: 30.9 PG (ref 25.7–33.7)
MCHC RBC AUTO-ENTMCNC: 33.7 G/DL (ref 32–35.9)
MCV RBC: 91.7 FL (ref 80–96)
PLATELET # BLD AUTO: 62 10^3/UL (ref 134–434)
PMV BLD: 7.4 FL (ref 7.5–11.1)
PROT SERPL-MCNC: 5.4 G/DL (ref 6.4–8.2)
RBC # BLD AUTO: 2.48 M/MM3 (ref 4–5.6)
SODIUM SERPL-SCNC: 143 MMOL/L (ref 136–145)
WBC # BLD AUTO: 1.8 K/MM3 (ref 4–10)

## 2022-08-13 RX ADMIN — VALACYCLOVIR HYDROCHLORIDE SCH MG: 500 TABLET ORAL at 09:44

## 2022-08-13 RX ADMIN — POLYETHYLENE GLYCOL 3350 SCH GM: 17 POWDER, FOR SOLUTION ORAL at 06:13

## 2022-08-13 RX ADMIN — POLYETHYLENE GLYCOL 3350 SCH: 17 POWDER, FOR SOLUTION ORAL at 22:26

## 2022-08-13 RX ADMIN — PANTOPRAZOLE SODIUM SCH MG: 40 TABLET, DELAYED RELEASE ORAL at 09:44

## 2022-08-13 RX ADMIN — POLYETHYLENE GLYCOL 3350 SCH GM: 17 POWDER, FOR SOLUTION ORAL at 13:06

## 2022-08-14 RX ADMIN — VALACYCLOVIR HYDROCHLORIDE SCH MG: 500 TABLET ORAL at 09:38

## 2022-08-14 RX ADMIN — POLYETHYLENE GLYCOL 3350 SCH: 17 POWDER, FOR SOLUTION ORAL at 06:09

## 2022-08-14 RX ADMIN — PANTOPRAZOLE SODIUM SCH MG: 40 TABLET, DELAYED RELEASE ORAL at 09:38

## 2022-08-15 LAB
ALBUMIN SERPL-MCNC: 2.9 G/DL (ref 3.4–5)
ALP SERPL-CCNC: 128 U/L (ref 45–117)
ALT SERPL-CCNC: 6 U/L (ref 13–61)
ANION GAP SERPL CALC-SCNC: 5 MMOL/L (ref 8–16)
AST SERPL-CCNC: 6 U/L (ref 15–37)
BILIRUB SERPL-MCNC: 2 MG/DL (ref 0.2–1)
BUN SERPL-MCNC: 20.7 MG/DL (ref 7–18)
CALCIUM SERPL-MCNC: 8.3 MG/DL (ref 8.5–10.1)
CHLORIDE SERPL-SCNC: 108 MMOL/L (ref 98–107)
CO2 SERPL-SCNC: 30 MMOL/L (ref 21–32)
CREAT SERPL-MCNC: 1.3 MG/DL (ref 0.55–1.3)
DEPRECATED RDW RBC AUTO: 17.8 % (ref 11.9–15.9)
GLUCOSE SERPL-MCNC: 109 MG/DL (ref 74–106)
HCT VFR BLD CALC: 21.9 % (ref 35.4–49)
HGB BLD-MCNC: 7.5 GM/DL (ref 11.7–16.9)
MCH RBC QN AUTO: 31.3 PG (ref 25.7–33.7)
MCHC RBC AUTO-ENTMCNC: 34.1 G/DL (ref 32–35.9)
MCV RBC: 91.6 FL (ref 80–96)
PLATELET # BLD AUTO: 47 10^3/UL (ref 134–434)
PMV BLD: 7.3 FL (ref 7.5–11.1)
PROT SERPL-MCNC: 5.4 G/DL (ref 6.4–8.2)
RBC # BLD AUTO: 2.4 M/MM3 (ref 4–5.6)
SODIUM SERPL-SCNC: 142 MMOL/L (ref 136–145)
WBC # BLD AUTO: 1.3 K/MM3 (ref 4–10)

## 2022-08-15 RX ADMIN — VALACYCLOVIR HYDROCHLORIDE SCH MG: 500 TABLET ORAL at 09:37

## 2022-08-15 RX ADMIN — PANTOPRAZOLE SODIUM SCH MG: 40 TABLET, DELAYED RELEASE ORAL at 09:37

## 2022-08-16 LAB
ALBUMIN SERPL-MCNC: 3 G/DL (ref 3.4–5)
ALP SERPL-CCNC: 132 U/L (ref 45–117)
ALT SERPL-CCNC: < 6 U/L (ref 13–61)
ANION GAP SERPL CALC-SCNC: 4 MMOL/L (ref 8–16)
AST SERPL-CCNC: 9 U/L (ref 15–37)
BILIRUB SERPL-MCNC: 1.5 MG/DL (ref 0.2–1)
BUN SERPL-MCNC: 23.1 MG/DL (ref 7–18)
CALCIUM SERPL-MCNC: 8.5 MG/DL (ref 8.5–10.1)
CHLORIDE SERPL-SCNC: 109 MMOL/L (ref 98–107)
CO2 SERPL-SCNC: 30 MMOL/L (ref 21–32)
CREAT SERPL-MCNC: 1.2 MG/DL (ref 0.55–1.3)
DEPRECATED RDW RBC AUTO: 17.2 % (ref 11.9–15.9)
GLUCOSE SERPL-MCNC: 96 MG/DL (ref 74–106)
HCT VFR BLD CALC: 21.7 % (ref 35.4–49)
HGB BLD-MCNC: 7.4 GM/DL (ref 11.7–16.9)
MCH RBC QN AUTO: 31.3 PG (ref 25.7–33.7)
MCHC RBC AUTO-ENTMCNC: 34.1 G/DL (ref 32–35.9)
MCV RBC: 91.6 FL (ref 80–96)
PLATELET # BLD AUTO: 40 10^3/UL (ref 134–434)
PMV BLD: 8.1 FL (ref 7.5–11.1)
PROT SERPL-MCNC: 5.4 G/DL (ref 6.4–8.2)
RBC # BLD AUTO: 2.37 M/MM3 (ref 4–5.6)
SODIUM SERPL-SCNC: 142 MMOL/L (ref 136–145)
WBC # BLD AUTO: 1.3 K/MM3 (ref 4–10)

## 2022-08-16 RX ADMIN — PANTOPRAZOLE SODIUM SCH MG: 40 TABLET, DELAYED RELEASE ORAL at 09:31

## 2022-08-16 RX ADMIN — VALACYCLOVIR HYDROCHLORIDE SCH MG: 500 TABLET ORAL at 09:31

## 2022-08-17 VITALS
RESPIRATION RATE: 19 BRPM | TEMPERATURE: 82 F | HEART RATE: 82 BPM | SYSTOLIC BLOOD PRESSURE: 147 MMHG | DIASTOLIC BLOOD PRESSURE: 57 MMHG

## 2022-08-17 LAB
ALBUMIN SERPL-MCNC: 3 G/DL (ref 3.4–5)
ALP SERPL-CCNC: 125 U/L (ref 45–117)
ALT SERPL-CCNC: < 6 U/L (ref 13–61)
ANION GAP SERPL CALC-SCNC: 6 MMOL/L (ref 8–16)
AST SERPL-CCNC: 5 U/L (ref 15–37)
BILIRUB SERPL-MCNC: 2 MG/DL (ref 0.2–1)
BUN SERPL-MCNC: 18.8 MG/DL (ref 7–18)
CALCIUM SERPL-MCNC: 8.6 MG/DL (ref 8.5–10.1)
CHLORIDE SERPL-SCNC: 107 MMOL/L (ref 98–107)
CO2 SERPL-SCNC: 30 MMOL/L (ref 21–32)
CREAT SERPL-MCNC: 1.2 MG/DL (ref 0.55–1.3)
DEPRECATED RDW RBC AUTO: 17.6 % (ref 11.9–15.9)
GLUCOSE SERPL-MCNC: 95 MG/DL (ref 74–106)
HCT VFR BLD CALC: 21.2 % (ref 35.4–49)
HGB BLD-MCNC: 7.2 GM/DL (ref 11.7–16.9)
MCH RBC QN AUTO: 30.9 PG (ref 25.7–33.7)
MCHC RBC AUTO-ENTMCNC: 34 G/DL (ref 32–35.9)
MCV RBC: 91 FL (ref 80–96)
PLATELET # BLD AUTO: 34 10^3/UL (ref 134–434)
PMV BLD: 8.3 FL (ref 7.5–11.1)
PROT SERPL-MCNC: 5.4 G/DL (ref 6.4–8.2)
RBC # BLD AUTO: 2.33 M/MM3 (ref 4–5.6)
SODIUM SERPL-SCNC: 143 MMOL/L (ref 136–145)
WBC # BLD AUTO: 1.3 K/MM3 (ref 4–10)

## 2022-08-17 RX ADMIN — VALACYCLOVIR HYDROCHLORIDE SCH MG: 500 TABLET ORAL at 10:31

## 2022-08-17 RX ADMIN — PANTOPRAZOLE SODIUM SCH MG: 40 TABLET, DELAYED RELEASE ORAL at 10:31

## 2022-09-05 ENCOUNTER — HOSPITAL ENCOUNTER (INPATIENT)
Dept: HOSPITAL 74 - JER | Age: 82
LOS: 3 days | Discharge: NURSING, REHAB OR LONG-TERM CARE | DRG: 841 | End: 2022-09-08
Attending: INTERNAL MEDICINE | Admitting: INTERNAL MEDICINE
Payer: COMMERCIAL

## 2022-09-05 VITALS — BODY MASS INDEX: 22.2 KG/M2

## 2022-09-05 DIAGNOSIS — D61.818: ICD-10-CM

## 2022-09-05 DIAGNOSIS — G20: ICD-10-CM

## 2022-09-05 DIAGNOSIS — K21.9: ICD-10-CM

## 2022-09-05 DIAGNOSIS — N18.30: ICD-10-CM

## 2022-09-05 DIAGNOSIS — C90.00: Primary | ICD-10-CM

## 2022-09-05 DIAGNOSIS — N17.9: ICD-10-CM

## 2022-09-05 DIAGNOSIS — D63.0: ICD-10-CM

## 2022-09-05 LAB
ALBUMIN SERPL-MCNC: 3.2 G/DL (ref 3.4–5)
ALP SERPL-CCNC: 124 U/L (ref 45–117)
ALT SERPL-CCNC: 6 U/L (ref 13–61)
ANION GAP SERPL CALC-SCNC: 11 MMOL/L (ref 8–16)
ANISOCYTOSIS BLD QL: (no result)
APTT BLD: 27.1 SECONDS (ref 25.2–36.5)
AST SERPL-CCNC: 6 U/L (ref 15–37)
BASOPHILS # BLD: 0.2 % (ref 0–2)
BILIRUB SERPL-MCNC: 1.1 MG/DL (ref 0.2–1)
BUN SERPL-MCNC: 29.7 MG/DL (ref 7–18)
CALCIUM SERPL-MCNC: 8.4 MG/DL (ref 8.5–10.1)
CHLORIDE SERPL-SCNC: 110 MMOL/L (ref 98–107)
CO2 SERPL-SCNC: 21 MMOL/L (ref 21–32)
CREAT SERPL-MCNC: 2 MG/DL (ref 0.55–1.3)
DEPRECATED RDW RBC AUTO: 19 % (ref 11.9–15.9)
EOSINOPHIL # BLD: 0.4 % (ref 0–4.5)
GLUCOSE SERPL-MCNC: 103 MG/DL (ref 74–106)
HCT VFR BLD CALC: 18.2 % (ref 35.4–49)
HGB BLD-MCNC: 6.1 GM/DL (ref 11.7–16.9)
INR BLD: 1.13 (ref 0.83–1.09)
INR BLD: 1.15 (ref 0.83–1.09)
LYMPHOCYTES # BLD: 21.7 % (ref 8–40)
MACROCYTES BLD QL: (no result)
MCH RBC QN AUTO: 31 PG (ref 25.7–33.7)
MCHC RBC AUTO-ENTMCNC: 33.5 G/DL (ref 32–35.9)
MCV RBC: 92.6 FL (ref 80–96)
MONOCYTES # BLD AUTO: 10.3 % (ref 3.8–10.2)
NEUTROPHILS # BLD: 67.4 % (ref 42.8–82.8)
OVALOCYTES BLD QL SMEAR: (no result)
PLATELET # BLD AUTO: 30 10^3/UL (ref 134–434)
PLATELET BLD QL SMEAR: (no result)
PMV BLD: 8.7 FL (ref 7.5–11.1)
PROT SERPL-MCNC: 5.5 G/DL (ref 6.4–8.2)
PT PNL PPP: 13 SEC (ref 9.7–13)
PT PNL PPP: 13.2 SEC (ref 9.7–13)
RBC # BLD AUTO: 1.97 M/MM3 (ref 4–5.6)
SODIUM SERPL-SCNC: 141 MMOL/L (ref 136–145)
WBC # BLD AUTO: 1.4 K/MM3 (ref 4–10)

## 2022-09-05 PROCEDURE — U0003 INFECTIOUS AGENT DETECTION BY NUCLEIC ACID (DNA OR RNA); SEVERE ACUTE RESPIRATORY SYNDROME CORONAVIRUS 2 (SARS-COV-2) (CORONAVIRUS DISEASE [COVID-19]), AMPLIFIED PROBE TECHNIQUE, MAKING USE OF HIGH THROUGHPUT TECHNOLOGIES AS DESCRIBED BY CMS-2020-01-R: HCPCS

## 2022-09-05 PROCEDURE — U0005 INFEC AGEN DETEC AMPLI PROBE: HCPCS

## 2022-09-05 PROCEDURE — P9058 RBC, L/R, CMV-NEG, IRRAD: HCPCS

## 2022-09-06 LAB
ALBUMIN SERPL-MCNC: 2.9 G/DL (ref 3.4–5)
ALP SERPL-CCNC: 113 U/L (ref 45–117)
ALT SERPL-CCNC: 8 U/L (ref 13–61)
ANION GAP SERPL CALC-SCNC: 8 MMOL/L (ref 8–16)
ANISOCYTOSIS BLD QL: (no result)
AST SERPL-CCNC: 5 U/L (ref 15–37)
BILIRUB SERPL-MCNC: 1.3 MG/DL (ref 0.2–1)
BUN SERPL-MCNC: 27.9 MG/DL (ref 7–18)
CALCIUM SERPL-MCNC: 8.2 MG/DL (ref 8.5–10.1)
CHLORIDE SERPL-SCNC: 113 MMOL/L (ref 98–107)
CO2 SERPL-SCNC: 22 MMOL/L (ref 21–32)
CREAT SERPL-MCNC: 1.6 MG/DL (ref 0.55–1.3)
DEPRECATED RDW RBC AUTO: 18.7 % (ref 11.9–15.9)
GLUCOSE SERPL-MCNC: 102 MG/DL (ref 74–106)
HCT VFR BLD CALC: 15.9 % (ref 35.4–49)
HGB BLD-MCNC: 5.2 GM/DL (ref 11.7–16.9)
IRON SERPL-MCNC: 75 UG/DL (ref 50–175)
MACROCYTES BLD QL: 0
MAGNESIUM SERPL-MCNC: 1.8 MG/DL (ref 1.8–2.4)
MCH RBC QN AUTO: 30.5 PG (ref 25.7–33.7)
MCHC RBC AUTO-ENTMCNC: 32.9 G/DL (ref 32–35.9)
MCV RBC: 92.6 FL (ref 80–96)
PHOSPHATE SERPL-MCNC: 3.4 MG/DL (ref 2.5–4.9)
PLATELET # BLD AUTO: 23 10^3/UL (ref 134–434)
PLATELET BLD QL SMEAR: (no result)
PMV BLD: 8.1 FL (ref 7.5–11.1)
PROT SERPL-MCNC: 5.2 G/DL (ref 6.4–8.2)
RBC # BLD AUTO: 1.71 M/MM3 (ref 4–5.6)
SODIUM SERPL-SCNC: 143 MMOL/L (ref 136–145)
TIBC SERPL-MCNC: 267 UG/DL (ref 250–450)
WBC # BLD AUTO: 1.1 K/MM3 (ref 4–10)

## 2022-09-06 RX ADMIN — VALACYCLOVIR HYDROCHLORIDE SCH MG: 500 TABLET ORAL at 10:34

## 2022-09-06 RX ADMIN — PANTOPRAZOLE SODIUM SCH MG: 40 TABLET, DELAYED RELEASE ORAL at 10:34

## 2022-09-07 LAB
ANION GAP SERPL CALC-SCNC: 8 MMOL/L (ref 8–16)
ANISOCYTOSIS BLD QL: (no result)
ANISOCYTOSIS BLD QL: (no result)
BASOPHILS # BLD: 1.8 % (ref 0–2)
BILIRUB DIRECT SERPL-MCNC: 138 U/L (ref 87–246)
BUN SERPL-MCNC: 24.6 MG/DL (ref 7–18)
CALCIUM SERPL-MCNC: 8.5 MG/DL (ref 8.5–10.1)
CHLORIDE SERPL-SCNC: 113 MMOL/L (ref 98–107)
CO2 SERPL-SCNC: 22 MMOL/L (ref 21–32)
CREAT SERPL-MCNC: 1.5 MG/DL (ref 0.55–1.3)
DACRYOCYTES BLD QL SMEAR: (no result)
DACRYOCYTES BLD QL SMEAR: (no result)
DEPRECATED RDW RBC AUTO: 19.4 % (ref 11.9–15.9)
DEPRECATED RDW RBC AUTO: 19.4 % (ref 11.9–15.9)
EOSINOPHIL # BLD: 0.7 % (ref 0–4.5)
GLUCOSE SERPL-MCNC: 107 MG/DL (ref 74–106)
HCT VFR BLD CALC: 20.1 % (ref 35.4–49)
HCT VFR BLD CALC: 24.2 % (ref 35.4–49)
HGB BLD-MCNC: 6.7 GM/DL (ref 11.7–16.9)
HGB BLD-MCNC: 8.1 GM/DL (ref 11.7–16.9)
LYMPHOCYTES # BLD: 18.9 % (ref 8–40)
MACROCYTES BLD QL: 0
MACROCYTES BLD QL: 0
MCH RBC QN AUTO: 29.6 PG (ref 25.7–33.7)
MCH RBC QN AUTO: 29.9 PG (ref 25.7–33.7)
MCHC RBC AUTO-ENTMCNC: 33.4 G/DL (ref 32–35.9)
MCHC RBC AUTO-ENTMCNC: 33.4 G/DL (ref 32–35.9)
MCV RBC: 88.6 FL (ref 80–96)
MCV RBC: 89.4 FL (ref 80–96)
MONOCYTES # BLD AUTO: 11 % (ref 3.8–10.2)
NEUTROPHILS # BLD: 67.6 % (ref 42.8–82.8)
OVALOCYTES BLD QL SMEAR: (no result)
PLATELET # BLD AUTO: 27 10^3/UL (ref 134–434)
PLATELET # BLD AUTO: 28 10^3/UL (ref 134–434)
PLATELET BLD QL SMEAR: (no result)
PMV BLD: 8.8 FL (ref 7.5–11.1)
PMV BLD: 8.9 FL (ref 7.5–11.1)
RBC # BLD AUTO: 2.25 M/MM3 (ref 4–5.6)
RBC # BLD AUTO: 2.73 M/MM3 (ref 4–5.6)
SODIUM SERPL-SCNC: 143 MMOL/L (ref 136–145)
WBC # BLD AUTO: 1.1 K/MM3 (ref 4–10)
WBC # BLD AUTO: 1.8 K/MM3 (ref 4–10)

## 2022-09-07 RX ADMIN — PANTOPRAZOLE SODIUM SCH MG: 40 TABLET, DELAYED RELEASE ORAL at 10:05

## 2022-09-07 RX ADMIN — VALACYCLOVIR HYDROCHLORIDE SCH MG: 500 TABLET ORAL at 10:05

## 2022-09-07 RX ADMIN — POTASSIUM CHLORIDE SCH MEQ: 1500 TABLET, EXTENDED RELEASE ORAL at 10:05

## 2022-09-08 VITALS — HEART RATE: 79 BPM | SYSTOLIC BLOOD PRESSURE: 129 MMHG | DIASTOLIC BLOOD PRESSURE: 50 MMHG | TEMPERATURE: 98 F

## 2022-09-08 VITALS — RESPIRATION RATE: 18 BRPM

## 2022-09-08 LAB
ALBUMIN SERPL-MCNC: 2.9 G/DL (ref 3.4–5)
ALP SERPL-CCNC: 104 U/L (ref 45–117)
ALT SERPL-CCNC: < 6 U/L (ref 13–61)
ANION GAP SERPL CALC-SCNC: 5 MMOL/L (ref 8–16)
ANISOCYTOSIS BLD QL: (no result)
AST SERPL-CCNC: 8 U/L (ref 15–37)
BILIRUB SERPL-MCNC: 1.1 MG/DL (ref 0.2–1)
BUN SERPL-MCNC: 27.7 MG/DL (ref 7–18)
CALCIUM SERPL-MCNC: 8.3 MG/DL (ref 8.5–10.1)
CHLORIDE SERPL-SCNC: 111 MMOL/L (ref 98–107)
CO2 SERPL-SCNC: 26 MMOL/L (ref 21–32)
CREAT SERPL-MCNC: 1.3 MG/DL (ref 0.55–1.3)
DEPRECATED RDW RBC AUTO: 19.1 % (ref 11.9–15.9)
GLUCOSE SERPL-MCNC: 97 MG/DL (ref 74–106)
HCT VFR BLD CALC: 21.8 % (ref 35.4–49)
HGB BLD-MCNC: 7.4 GM/DL (ref 11.7–16.9)
MACROCYTES BLD QL: (no result)
MCH RBC QN AUTO: 29.9 PG (ref 25.7–33.7)
MCHC RBC AUTO-ENTMCNC: 33.9 G/DL (ref 32–35.9)
MCV RBC: 88.1 FL (ref 80–96)
PLATELET # BLD AUTO: 24 10^3/UL (ref 134–434)
PMV BLD: 9.2 FL (ref 7.5–11.1)
PROT SERPL-MCNC: 4.9 G/DL (ref 6.4–8.2)
RBC # BLD AUTO: 2.48 M/MM3 (ref 4–5.6)
SODIUM SERPL-SCNC: 142 MMOL/L (ref 136–145)
WBC # BLD AUTO: 1.6 K/MM3 (ref 4–10)

## 2022-09-08 RX ADMIN — POTASSIUM CHLORIDE SCH MEQ: 1500 TABLET, EXTENDED RELEASE ORAL at 09:43

## 2022-09-08 RX ADMIN — VALACYCLOVIR HYDROCHLORIDE SCH MG: 500 TABLET ORAL at 09:43

## 2022-09-08 RX ADMIN — PANTOPRAZOLE SODIUM SCH MG: 40 TABLET, DELAYED RELEASE ORAL at 09:43

## 2022-09-20 ENCOUNTER — HOSPITAL ENCOUNTER (INPATIENT)
Dept: HOSPITAL 74 - JER | Age: 82
LOS: 6 days | Discharge: NURSING, REHAB OR LONG-TERM CARE | DRG: 841 | End: 2022-09-26
Attending: INTERNAL MEDICINE | Admitting: INTERNAL MEDICINE
Payer: COMMERCIAL

## 2022-09-20 VITALS — BODY MASS INDEX: 21.9 KG/M2

## 2022-09-20 DIAGNOSIS — D63.0: ICD-10-CM

## 2022-09-20 DIAGNOSIS — I12.9: ICD-10-CM

## 2022-09-20 DIAGNOSIS — J44.9: ICD-10-CM

## 2022-09-20 DIAGNOSIS — Z86.711: ICD-10-CM

## 2022-09-20 DIAGNOSIS — D61.818: ICD-10-CM

## 2022-09-20 DIAGNOSIS — F02.80: ICD-10-CM

## 2022-09-20 DIAGNOSIS — D64.9: ICD-10-CM

## 2022-09-20 DIAGNOSIS — G20: ICD-10-CM

## 2022-09-20 DIAGNOSIS — Z79.01: ICD-10-CM

## 2022-09-20 DIAGNOSIS — N18.30: ICD-10-CM

## 2022-09-20 DIAGNOSIS — C90.00: Primary | ICD-10-CM

## 2022-09-20 LAB
ALBUMIN SERPL-MCNC: 2.8 G/DL (ref 3.4–5)
ALP SERPL-CCNC: 106 U/L (ref 45–117)
ALT SERPL-CCNC: < 6 U/L (ref 13–61)
ANION GAP SERPL CALC-SCNC: 7 MMOL/L (ref 8–16)
ANISOCYTOSIS BLD QL: (no result)
APTT BLD: 28.5 SECONDS (ref 25.2–36.5)
AST SERPL-CCNC: 6 U/L (ref 15–37)
BASOPHILS # BLD: 0.3 % (ref 0–2)
BILIRUB SERPL-MCNC: 0.9 MG/DL (ref 0.2–1)
BNP SERPL-MCNC: 2526.6 PG/ML (ref 5–450)
BUN SERPL-MCNC: 26.9 MG/DL (ref 7–18)
CALCIUM SERPL-MCNC: 8 MG/DL (ref 8.5–10.1)
CHLORIDE SERPL-SCNC: 112 MMOL/L (ref 98–107)
CO2 SERPL-SCNC: 22 MMOL/L (ref 21–32)
CREAT SERPL-MCNC: 1.5 MG/DL (ref 0.55–1.3)
DACRYOCYTES BLD QL SMEAR: (no result)
DEPRECATED RDW RBC AUTO: 19.7 % (ref 11.9–15.9)
EOSINOPHIL # BLD: 47.3 % (ref 0–4.5)
GLUCOSE SERPL-MCNC: 97 MG/DL (ref 74–106)
HCT VFR BLD CALC: 18.7 % (ref 35.4–49)
HGB BLD-MCNC: 6.4 GM/DL (ref 11.7–16.9)
INR BLD: 1.15 (ref 0.83–1.09)
LYMPHOCYTES # BLD: 33.1 % (ref 8–40)
MACROCYTES BLD QL: 0
MCH RBC QN AUTO: 30.3 PG (ref 25.7–33.7)
MCHC RBC AUTO-ENTMCNC: 34.1 G/DL (ref 32–35.9)
MCV RBC: 88.9 FL (ref 80–96)
MONOCYTES # BLD AUTO: 7 % (ref 3.8–10.2)
NEUTROPHILS # BLD: 12.3 % (ref 42.8–82.8)
OVALOCYTES BLD QL SMEAR: (no result)
PLATELET # BLD AUTO: 25 10^3/UL (ref 134–434)
PMV BLD: 9.4 FL (ref 7.5–11.1)
PROT SERPL-MCNC: 5 G/DL (ref 6.4–8.2)
PT PNL PPP: 13.2 SEC (ref 9.7–13)
RBC # BLD AUTO: 2.1 M/MM3 (ref 4–5.6)
SODIUM SERPL-SCNC: 141 MMOL/L (ref 136–145)
TARGETS BLD QL SMEAR: (no result)
WBC # BLD AUTO: 1.2 K/MM3 (ref 4–10)

## 2022-09-20 PROCEDURE — P9058 RBC, L/R, CMV-NEG, IRRAD: HCPCS

## 2022-09-20 PROCEDURE — U0003 INFECTIOUS AGENT DETECTION BY NUCLEIC ACID (DNA OR RNA); SEVERE ACUTE RESPIRATORY SYNDROME CORONAVIRUS 2 (SARS-COV-2) (CORONAVIRUS DISEASE [COVID-19]), AMPLIFIED PROBE TECHNIQUE, MAKING USE OF HIGH THROUGHPUT TECHNOLOGIES AS DESCRIBED BY CMS-2020-01-R: HCPCS

## 2022-09-20 PROCEDURE — 30233N1 TRANSFUSION OF NONAUTOLOGOUS RED BLOOD CELLS INTO PERIPHERAL VEIN, PERCUTANEOUS APPROACH: ICD-10-PCS | Performed by: INTERNAL MEDICINE

## 2022-09-20 PROCEDURE — U0005 INFEC AGEN DETEC AMPLI PROBE: HCPCS

## 2022-09-21 RX ADMIN — POTASSIUM CHLORIDE SCH MEQ: 1500 TABLET, EXTENDED RELEASE ORAL at 10:30

## 2022-09-21 RX ADMIN — PANTOPRAZOLE SODIUM SCH MG: 40 TABLET, DELAYED RELEASE ORAL at 09:41

## 2022-09-22 LAB
ALBUMIN SERPL-MCNC: 2.8 G/DL (ref 3.4–5)
ALP SERPL-CCNC: 112 U/L (ref 45–117)
ALT SERPL-CCNC: 6 U/L (ref 13–61)
ANION GAP SERPL CALC-SCNC: 4 MMOL/L (ref 8–16)
ANISOCYTOSIS BLD QL: (no result)
AST SERPL-CCNC: 11 U/L (ref 15–37)
BILIRUB SERPL-MCNC: 1.3 MG/DL (ref 0.2–1)
BUN SERPL-MCNC: 22.4 MG/DL (ref 7–18)
CALCIUM SERPL-MCNC: 8.3 MG/DL (ref 8.5–10.1)
CHLORIDE SERPL-SCNC: 112 MMOL/L (ref 98–107)
CO2 SERPL-SCNC: 27 MMOL/L (ref 21–32)
CREAT SERPL-MCNC: 1.1 MG/DL (ref 0.55–1.3)
DEPRECATED RDW RBC AUTO: 19.9 % (ref 11.9–15.9)
GLUCOSE SERPL-MCNC: 97 MG/DL (ref 74–106)
HCT VFR BLD CALC: 27 % (ref 35.4–49)
HGB BLD-MCNC: 9.2 GM/DL (ref 11.7–16.9)
MACROCYTES BLD QL: (no result)
MCH RBC QN AUTO: 29.5 PG (ref 25.7–33.7)
MCHC RBC AUTO-ENTMCNC: 34.1 G/DL (ref 32–35.9)
MCV RBC: 86.7 FL (ref 80–96)
PHOSPHATE SERPL-MCNC: 2.6 MG/DL (ref 2.5–4.9)
PLATELET # BLD AUTO: 31 10^3/UL (ref 134–434)
PMV BLD: 7.8 FL (ref 7.5–11.1)
PROT SERPL-MCNC: 5.2 G/DL (ref 6.4–8.2)
RBC # BLD AUTO: 3.12 M/MM3 (ref 4–5.6)
SODIUM SERPL-SCNC: 143 MMOL/L (ref 136–145)
WBC # BLD AUTO: 1.8 K/MM3 (ref 4–10)

## 2022-09-22 RX ADMIN — PANTOPRAZOLE SODIUM SCH MG: 40 TABLET, DELAYED RELEASE ORAL at 09:06

## 2022-09-22 RX ADMIN — POTASSIUM CHLORIDE SCH MEQ: 1500 TABLET, EXTENDED RELEASE ORAL at 09:06

## 2022-09-23 LAB
ALBUMIN SERPL-MCNC: 2.6 G/DL (ref 3.4–5)
ALP SERPL-CCNC: 103 U/L (ref 45–117)
ALT SERPL-CCNC: < 6 U/L (ref 13–61)
ANION GAP SERPL CALC-SCNC: 3 MMOL/L (ref 8–16)
ANISOCYTOSIS BLD QL: (no result)
AST SERPL-CCNC: 5 U/L (ref 15–37)
BILIRUB SERPL-MCNC: 0.7 MG/DL (ref 0.2–1)
BUN SERPL-MCNC: 24.1 MG/DL (ref 7–18)
CALCIUM SERPL-MCNC: 8.4 MG/DL (ref 8.5–10.1)
CHLORIDE SERPL-SCNC: 110 MMOL/L (ref 98–107)
CO2 SERPL-SCNC: 29 MMOL/L (ref 21–32)
CREAT SERPL-MCNC: 1.1 MG/DL (ref 0.55–1.3)
DEPRECATED RDW RBC AUTO: 20.1 % (ref 11.9–15.9)
GLUCOSE SERPL-MCNC: 105 MG/DL (ref 74–106)
HCT VFR BLD CALC: 23.3 % (ref 35.4–49)
HGB BLD-MCNC: 8 GM/DL (ref 11.7–16.9)
MACROCYTES BLD QL: (no result)
MAGNESIUM SERPL-MCNC: 1.9 MG/DL (ref 1.8–2.4)
MCH RBC QN AUTO: 29.7 PG (ref 25.7–33.7)
MCHC RBC AUTO-ENTMCNC: 34.2 G/DL (ref 32–35.9)
MCV RBC: 86.7 FL (ref 80–96)
PHOSPHATE SERPL-MCNC: 2.8 MG/DL (ref 2.5–4.9)
PLATELET # BLD AUTO: 20 10^3/UL (ref 134–434)
PMV BLD: 9.1 FL (ref 7.5–11.1)
PROT SERPL-MCNC: 4.7 G/DL (ref 6.4–8.2)
RBC # BLD AUTO: 2.68 M/MM3 (ref 4–5.6)
SODIUM SERPL-SCNC: 142 MMOL/L (ref 136–145)
WBC # BLD AUTO: 0.9 K/MM3 (ref 4–10)

## 2022-09-23 RX ADMIN — PANTOPRAZOLE SODIUM SCH MG: 40 TABLET, DELAYED RELEASE ORAL at 09:23

## 2022-09-23 RX ADMIN — VALACYCLOVIR HYDROCHLORIDE SCH MG: 500 TABLET ORAL at 21:00

## 2022-09-23 RX ADMIN — POTASSIUM CHLORIDE SCH MEQ: 1500 TABLET, EXTENDED RELEASE ORAL at 09:23

## 2022-09-23 RX ADMIN — VALACYCLOVIR HYDROCHLORIDE SCH MG: 500 TABLET ORAL at 13:19

## 2022-09-24 LAB
ALBUMIN SERPL-MCNC: 2.6 G/DL (ref 3.4–5)
ALP SERPL-CCNC: 99 U/L (ref 45–117)
ALT SERPL-CCNC: < 6 U/L (ref 13–61)
ANION GAP SERPL CALC-SCNC: 3 MMOL/L (ref 8–16)
ANISOCYTOSIS BLD QL: (no result)
AST SERPL-CCNC: 3 U/L (ref 15–37)
BILIRUB SERPL-MCNC: 0.8 MG/DL (ref 0.2–1)
BUN SERPL-MCNC: 28.2 MG/DL (ref 7–18)
CALCIUM SERPL-MCNC: 8.5 MG/DL (ref 8.5–10.1)
CHLORIDE SERPL-SCNC: 109 MMOL/L (ref 98–107)
CO2 SERPL-SCNC: 31 MMOL/L (ref 21–32)
CREAT SERPL-MCNC: 1.1 MG/DL (ref 0.55–1.3)
DEPRECATED RDW RBC AUTO: 19.8 % (ref 11.9–15.9)
GLUCOSE SERPL-MCNC: 93 MG/DL (ref 74–106)
HCT VFR BLD CALC: 24 % (ref 35.4–49)
HGB BLD-MCNC: 8 GM/DL (ref 11.7–16.9)
MACROCYTES BLD QL: 0
MAGNESIUM SERPL-MCNC: 2 MG/DL (ref 1.8–2.4)
MCH RBC QN AUTO: 29.4 PG (ref 25.7–33.7)
MCHC RBC AUTO-ENTMCNC: 33.5 G/DL (ref 32–35.9)
MCV RBC: 87.9 FL (ref 80–96)
PHOSPHATE SERPL-MCNC: 2.8 MG/DL (ref 2.5–4.9)
PLATELET # BLD AUTO: 19 10^3/UL (ref 134–434)
PLATELET BLD QL SMEAR: (no result)
PMV BLD: 9 FL (ref 7.5–11.1)
PROT SERPL-MCNC: 4.9 G/DL (ref 6.4–8.2)
RBC # BLD AUTO: 2.74 M/MM3 (ref 4–5.6)
ROULEAUX BLD QL SMEAR: (no result)
SODIUM SERPL-SCNC: 142 MMOL/L (ref 136–145)
WBC # BLD AUTO: 0.8 K/MM3 (ref 4–10)

## 2022-09-24 RX ADMIN — AMOXICILLIN SCH MG: 500 CAPSULE ORAL at 21:58

## 2022-09-24 RX ADMIN — PANTOPRAZOLE SODIUM SCH MG: 40 TABLET, DELAYED RELEASE ORAL at 11:26

## 2022-09-24 RX ADMIN — VALACYCLOVIR HYDROCHLORIDE SCH MG: 500 TABLET ORAL at 21:58

## 2022-09-24 RX ADMIN — VALACYCLOVIR HYDROCHLORIDE SCH MG: 500 TABLET ORAL at 11:24

## 2022-09-24 RX ADMIN — POTASSIUM CHLORIDE SCH MEQ: 1500 TABLET, EXTENDED RELEASE ORAL at 11:24

## 2022-09-24 RX ADMIN — FLUCONAZOLE SCH MG: 100 TABLET ORAL at 21:58

## 2022-09-25 LAB
ALBUMIN SERPL-MCNC: 3 G/DL (ref 3.4–5)
ALP SERPL-CCNC: 112 U/L (ref 45–117)
ALT SERPL-CCNC: 7 U/L (ref 13–61)
ANION GAP SERPL CALC-SCNC: 8 MMOL/L (ref 8–16)
ANISOCYTOSIS BLD QL: (no result)
AST SERPL-CCNC: 7 U/L (ref 15–37)
BILIRUB SERPL-MCNC: 0.7 MG/DL (ref 0.2–1)
BUN SERPL-MCNC: 24.8 MG/DL (ref 7–18)
CALCIUM SERPL-MCNC: 9.1 MG/DL (ref 8.5–10.1)
CHLORIDE SERPL-SCNC: 107 MMOL/L (ref 98–107)
CO2 SERPL-SCNC: 27 MMOL/L (ref 21–32)
CREAT SERPL-MCNC: 1.4 MG/DL (ref 0.55–1.3)
DEPRECATED RDW RBC AUTO: 19.5 % (ref 11.9–15.9)
GLUCOSE SERPL-MCNC: 101 MG/DL (ref 74–106)
HCT VFR BLD CALC: 28.1 % (ref 35.4–49)
HGB BLD-MCNC: 9.4 GM/DL (ref 11.7–16.9)
MACROCYTES BLD QL: 0
MCH RBC QN AUTO: 29.6 PG (ref 25.7–33.7)
MCHC RBC AUTO-ENTMCNC: 33.5 G/DL (ref 32–35.9)
MCV RBC: 88.4 FL (ref 80–96)
PLATELET # BLD AUTO: 19 10^3/UL (ref 134–434)
PLATELET BLD QL SMEAR: (no result)
PMV BLD: 9 FL (ref 7.5–11.1)
PROT SERPL-MCNC: 5.5 G/DL (ref 6.4–8.2)
RBC # BLD AUTO: 3.17 M/MM3 (ref 4–5.6)
SODIUM SERPL-SCNC: 142 MMOL/L (ref 136–145)
WBC # BLD AUTO: 0.9 K/MM3 (ref 4–10)

## 2022-09-25 RX ADMIN — AMOXICILLIN SCH MG: 500 CAPSULE ORAL at 15:46

## 2022-09-25 RX ADMIN — FLUCONAZOLE SCH MG: 100 TABLET ORAL at 10:09

## 2022-09-25 RX ADMIN — VALACYCLOVIR HYDROCHLORIDE SCH MG: 500 TABLET ORAL at 10:09

## 2022-09-25 RX ADMIN — AMOXICILLIN SCH MG: 500 CAPSULE ORAL at 22:01

## 2022-09-25 RX ADMIN — VALACYCLOVIR HYDROCHLORIDE SCH MG: 500 TABLET ORAL at 22:01

## 2022-09-25 RX ADMIN — POTASSIUM CHLORIDE SCH MEQ: 1500 TABLET, EXTENDED RELEASE ORAL at 10:09

## 2022-09-25 RX ADMIN — AMOXICILLIN SCH MG: 500 CAPSULE ORAL at 06:34

## 2022-09-25 RX ADMIN — PANTOPRAZOLE SODIUM SCH MG: 40 TABLET, DELAYED RELEASE ORAL at 10:09

## 2022-09-26 VITALS — TEMPERATURE: 98.5 F | DIASTOLIC BLOOD PRESSURE: 73 MMHG | HEART RATE: 87 BPM | SYSTOLIC BLOOD PRESSURE: 134 MMHG

## 2022-09-26 VITALS — RESPIRATION RATE: 20 BRPM

## 2022-09-26 LAB
ANION GAP SERPL CALC-SCNC: 6 MMOL/L (ref 8–16)
BUN SERPL-MCNC: 23.9 MG/DL (ref 7–18)
CALCIUM SERPL-MCNC: 8.6 MG/DL (ref 8.5–10.1)
CHLORIDE SERPL-SCNC: 107 MMOL/L (ref 98–107)
CO2 SERPL-SCNC: 27 MMOL/L (ref 21–32)
CREAT SERPL-MCNC: 1.1 MG/DL (ref 0.55–1.3)
DEPRECATED RDW RBC AUTO: 19.6 % (ref 11.9–15.9)
GLUCOSE SERPL-MCNC: 103 MG/DL (ref 74–106)
HCT VFR BLD CALC: 26.5 % (ref 35.4–49)
HGB BLD-MCNC: 8.8 GM/DL (ref 11.7–16.9)
MCH RBC QN AUTO: 29.3 PG (ref 25.7–33.7)
MCHC RBC AUTO-ENTMCNC: 33.4 G/DL (ref 32–35.9)
MCV RBC: 87.6 FL (ref 80–96)
PLATELET # BLD AUTO: 20 10^3/UL (ref 134–434)
PMV BLD: 9.2 FL (ref 7.5–11.1)
RBC # BLD AUTO: 3.02 M/MM3 (ref 4–5.6)
SODIUM SERPL-SCNC: 140 MMOL/L (ref 136–145)
WBC # BLD AUTO: 1 K/MM3 (ref 4–10)

## 2022-09-26 RX ADMIN — AMOXICILLIN SCH MG: 500 CAPSULE ORAL at 14:28

## 2022-09-26 RX ADMIN — AMOXICILLIN SCH MG: 500 CAPSULE ORAL at 06:52

## 2022-09-26 RX ADMIN — PANTOPRAZOLE SODIUM SCH MG: 40 TABLET, DELAYED RELEASE ORAL at 09:56

## 2022-09-26 RX ADMIN — VALACYCLOVIR HYDROCHLORIDE SCH MG: 500 TABLET ORAL at 09:56

## 2022-09-26 RX ADMIN — FLUCONAZOLE SCH MG: 100 TABLET ORAL at 09:57

## 2022-09-26 RX ADMIN — POTASSIUM CHLORIDE SCH MEQ: 1500 TABLET, EXTENDED RELEASE ORAL at 09:56
